# Patient Record
Sex: FEMALE | Race: WHITE | NOT HISPANIC OR LATINO | URBAN - METROPOLITAN AREA
[De-identification: names, ages, dates, MRNs, and addresses within clinical notes are randomized per-mention and may not be internally consistent; named-entity substitution may affect disease eponyms.]

---

## 2023-04-17 ENCOUNTER — TELEPHONE (OUTPATIENT)
Dept: PRIMARY CARE | Facility: CLINIC | Age: 48
End: 2023-04-17
Payer: COMMERCIAL

## 2023-08-17 ENCOUNTER — APPOINTMENT (OUTPATIENT)
Dept: LAB | Facility: LAB | Age: 48
End: 2023-08-17
Payer: COMMERCIAL

## 2023-08-18 LAB
ANION GAP IN SER/PLAS: 11 MMOL/L (ref 10–20)
CALCIUM (MG/DL) IN SER/PLAS: 9.1 MG/DL (ref 8.6–10.3)
CARBON DIOXIDE, TOTAL (MMOL/L) IN SER/PLAS: 27 MMOL/L (ref 21–32)
CHLORIDE (MMOL/L) IN SER/PLAS: 102 MMOL/L (ref 98–107)
CREATININE (MG/DL) IN SER/PLAS: 0.62 MG/DL (ref 0.5–1.05)
GFR FEMALE: >90 ML/MIN/1.73M2
GLUCOSE (MG/DL) IN SER/PLAS: 120 MG/DL (ref 74–99)
POTASSIUM (MMOL/L) IN SER/PLAS: 4.1 MMOL/L (ref 3.5–5.3)
SODIUM (MMOL/L) IN SER/PLAS: 136 MMOL/L (ref 136–145)
UREA NITROGEN (MG/DL) IN SER/PLAS: 12 MG/DL (ref 6–23)

## 2023-09-15 ENCOUNTER — TELEPHONE (OUTPATIENT)
Dept: PRIMARY CARE | Facility: CLINIC | Age: 48
End: 2023-09-15
Payer: COMMERCIAL

## 2023-09-15 DIAGNOSIS — J45.20 MILD INTERMITTENT ASTHMA WITHOUT COMPLICATION (HHS-HCC): Primary | ICD-10-CM

## 2023-09-15 RX ORDER — FLUTICASONE PROPIONATE AND SALMETEROL 250; 50 UG/1; UG/1
1 POWDER RESPIRATORY (INHALATION)
Qty: 60 EACH | Refills: 11 | Status: SHIPPED | OUTPATIENT
Start: 2023-09-15 | End: 2023-09-15

## 2023-09-15 NOTE — TELEPHONE ENCOUNTER
Needs to have advair inhaler refilled. Send to  [facility] - [place], [place] - [id number]

## 2023-09-16 ENCOUNTER — HOSPITAL ENCOUNTER (OUTPATIENT)
Facility: HOSPITAL | Age: 48
Setting detail: OUTPATIENT SURGERY
End: 2023-09-16
Attending: INTERNAL MEDICINE | Admitting: INTERNAL MEDICINE
Payer: COMMERCIAL

## 2023-09-16 DIAGNOSIS — I48.91 UNSPECIFIED ATRIAL FIBRILLATION (MULTI): ICD-10-CM

## 2023-10-04 NOTE — H&P (VIEW-ONLY)
Diagnoses/Problems  Assessed    · Afib (427.31) (I48.91)   · Paroxysmal atrial fibrillation with RVR (427.31) (I48.0)   · Obstructive sleep apnea (327.23) (G47.33)   · Morbid obesity with BMI of 45.0-49.9, adult (278.01,V85.42) (E66.01,Z68.42)   · Encounter to discuss test results (V65.49) (Z71.2)   · Encounter to discuss treatment options (V65.49) (Z71.89)   · Essential hypertension (401.9) (I10)   · High risk medication use (V58.69) (Z79.899)   · Encounter for medication counseling (V65.49) (Z71.89)   · Non-smoker (V49.89) (Z78.9)   · Anticoagulant long-term use (V58.61) (Z79.01)   · Preop cardiovascular exam (V72.81) (Z01.810)    Orders  Afib    · Complete Blood Count; Status:Active; Requested for:24Gzu6091;    · IO EKG Electrocardiogram- 12 Lead; Status:Active - Perform Order,Retrospective  Authorization; Requested for:[id number];   Atrial fibrillation    · Basic Metabolic Panel; Status:Active; Requested for:[id number];   Health Maintenance    · diphenhydrAMINE HCl - 25 MG Oral Capsule (Benadryl Allergy); take two capsule   am day of procedure   · predniSONE 20 MG Oral Tablet; take 3 tabs evening before procedure. 3 tabs am  day of procedure  Morbid obesity with BMI of 45.0-49.9, adult    · Healthy Weight Tips; Status:Complete - Retrospective Authorization;   Done: 70Khg5241   · Losing just 5 to 10 pounds may lower your risk of health problems.; Status:Complete -  Retrospective Authorization;   Done: 63Vgq8591  SocHx: Non-smoker    · Tobacco Use Screening; Status:Complete;   Done: 11Xhs0130    Patient Instructions    Continue same medications  Patient educated on proper medication use.  Patient educated on risk factor modification.  Please bring any lab or test results from other providers/physician to your next appointment.    Please bring all prescription medicines , vitamins, and herbal supplements, and over the counter medicines you use with you every time you come for appointment.     Prescriptions will  not be filled unless you are compliant with your follow up appointments or have a follow up appointment scheduled as per instruction of your physician. Refills should be requested at time of your visit.    IHerbie LPN , am scribing for and in the presence of Dr Ebony Luis.     Feb office visit      Chief Complaint  Patient presented today for a 5 month follow up.         Adult Risk Screening         Tobacco Screening: LUKASZ does not use tobacco.        History of Present Illness  She is here for electrophysiology follow-up      The patient is here to discuss atrial fibrillation.    She has irregular heartbeat couple times a week.  She denies any significant symptoms. Has not had to go to the emergency room..  She is compliant with her medications.      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    We discussed cryoablation PVI in detail. Shared decision making performed. Reviewed cryoablation brochure. She is concerned about her weight gain.  All questions answered. Consented    Discussed associated conditions with atrial fibrillation    She inquires about intermittent FMLA for a fib.    See ROS, PMH, FMH, and surgical history for details.  She's taken two doses of pill in pocket medications.  Longeset episode of a fib was 16 hours.    COVID recovered.      Personal review of ECG and cardiac data reviewed   Outside records:   CT chest. D ye allergy  Echocardiogram April 2023. LVEF 60%.. Relaxation. Mild to moderate TR. Mild worsening TR since 2020.  EP OV Apr 2023  EP OV in November 2022  Hosp Feb 2022  EP OV Aug 2021  Lexiscan July 2021. LVEF 65%. Normal perfusion.  Hospitalization June 2021. EP consult June 2021  ECG: June 2021    ECG today. Normal sinus rhythm. Normal axis. Corrected QT interval 460  ms      Imp / Plan  Paroxysmal transition to persistent atrial fibrillation. Recurrent. Reviewed medications. Reviewed medications. Continue medications. Refills.   Previously failed flecainide to suppress atrial fibrillation  Hgh risk med Eliquis. Pt follows with GYN re: menses and Eliquis  Mild sleep apnea per patient report. Follows with PCP.   Nonsustained wide-complex tachycardia. Likely atrial fibrillation with aberrancy. Normal ejection fraction.  No history of diabetes, TIA or CVA.  PACs and intra-atrial reentry tachycardia. Stable. Reviewed meds. Continue meds. Refills.  History of appendectomy, , ovarian cystectomy and fibroadenoma.  Covid recovered.  Overweight.      AHA recommendations for exercise, diet, and behavioral modification reviewed with pt.    The patient and I discussed the mechanism of arrhythmia, ECG, atrial fibrillation, brochure, shared decision making informed consent, indications for and types of medications, discussion if and what medication refills needed, treatment options, risks, benefits, and imponderables. American Heart Association lifestyle changes and behavioral modification discussed. All questions answered in detail. Counseling over 50% visit regarding above. Patient appreciative of care.           Surgical History  Problems    · History of Appendectomy   · History of  section   · History of Cyst excision   · History of Lumpectomy   · History of Ovarian cystectomy    Current Meds    Medication Name Instruction   Albuterol Sulfate  (90 Base) MCG/ACT Inhalation Aerosol Solution INHALE 2 PUFFS EVERY 4-6 HOURS, SPACED 60 SECONDS APART.   Allegra 180 MG TABS TAKE 1 TABLET DAILY.   ALPRAZolam 0.5 MG Oral Tablet Take 1 tablet daily   dilTIAZem HCl - 30 MG Oral Tablet Take one tablet  up to twice a day as needed for palpitations lasting 10 minutes ore longer.   Dofetilide 250 MCG Oral Capsule TAKE 1 CAPSULE TWICE DAILY.   Eliquis 5 MG Oral Tablet Take 1 tablet  twice daily   Famotidine 40 MG Oral Tablet TAKE 1 TABLET DAILY.   Iron TABS TAKE 1 TABLET ONCE DAILY.   Klor-Con M10 10 MEQ Oral Tablet Extended Release TAKE 1 TABLET DAILY.   Levothyroxine Sodium 75 MCG Oral Tablet TAKE 1 TABLET DAILY.   Losartan Potassium 25 MG Oral Tablet TAKE 1 TABLET DAILY.   Magnesium 400 MG Oral Tablet TAKE 2 TABLET Daily   Metoprolol Tartrate 50 MG Oral Tablet Take 1 tablet twice daily   PARoxetine HCl - 40 MG Oral Tablet TAKE 1 TABLET DAILY.     Allergies  Medication    · ciprofloxacin   Recorded By: Debbie Huynh; 1/30/2020 1:34:31 PM   · Iodinated Contrast Media   Recorded By: Sabrina Bunch; 9/15/2023 1:16:09 PM    Family History  Mother    · Family history of cerebrovascular accident (CVA) (V17.1) (Z82.3)   · Family history of hypertension (V17.49) (Z82.49)   · Family history of type 2 diabetes mellitus (V18.0) (Z83.3)  Father    · Family history of coronary artery disease (V17.3) (Z82.49)   · Family history of hypertension (V17.49) (Z82.49)   · Family history of obesity (V18.19) (Z83.49)   · Family history of type 2 diabetes mellitus (V18.0) (Z83.3)  Sister    · Family history of Healthy female adolescent  Brother    · Family history of cardiomegaly (V17.49) (Z82.49)   · Family history of hypothyroidism (V18.19) (Z83.49)    Social History  Problems    · Caffeine use (V49.89) (Z78.9)   · occasional   · Never a smoker   · No illicit drug use   · Non-smoker (V49.89) (Z78.9)   · Occasional alcohol use   · very rare    Review of Systems    Constitutional: as noted in HPI.   Cardiovascular: as noted in HPI.   Respiratory: as noted in HPI.   Neurological: as noted in HPI.   All other systems have been reviewed and are negative for complaint.      Vitals  Vital Signs    Recorded: 15Sep2023 02:17PM Recorded: 15Sep2023 01:03PM   Systolic 130, LUE, Sitting 134, LUE, Sitting   Diastolic 88, LUE, Sitting 90, LUE, Sitting   Heart Rate  67   Height  5 ft 3 in   Weight  266 lb    BMI Calculated   47.12 kg/m2   BSA Calculated  2.18   Tobacco Use  b) No   PHQ-2  #1. Over the last 2 weeks have you felt down, depressed or hopeless?  (If yes, answer PHQ-9 below)  No   Falls Screening (Age 18+)  a) No falls within the last year     Physical Exam    Constitutional:  morbidly obese.   Eyes: no erythema, swelling or discharge from the eye .   Neck: neck is supple, symmetric, trachea midline, no masses  and no thyromegaly .   Pulmonary: no increased work of breathing or signs of respiratory distress  and lungs clear to auscultation.    Cardiovascular: carotid pulses 2+ bilaterally with no bruit , JVP was normal, no thrills , regular rhythm, normal S1 and S2, no murmurs , pedal pulses 2+ bilaterally  and no edema .   Abdomen: abdomen non-tender, no masses  and no hepatomegaly .   Skin: skin warm and dry, normal skin turgor .   Psychiatric judgment and insight is normal  and oriented to person, place and time .      Procedure  EKG done in office today.         Time    Time spent with patient: 41 minutes of which greater than 50 percent was spent counseling and or coordinating care.      Signatures   Electronically signed by : Ebony Luis MD; Sep 15 2023  8:48PM EST (Author)

## 2023-10-04 NOTE — H&P (VIEW-ONLY)
Diagnoses/Problems  Assessed    · Afib (427.31) (I48.91)   · Paroxysmal atrial fibrillation with RVR (427.31) (I48.0)   · Obstructive sleep apnea (327.23) (G47.33)   · Morbid obesity with BMI of 45.0-49.9, adult (278.01,V85.42) (E66.01,Z68.42)   · Encounter to discuss test results (V65.49) (Z71.2)   · Encounter to discuss treatment options (V65.49) (Z71.89)   · Essential hypertension (401.9) (I10)   · High risk medication use (V58.69) (Z79.899)   · Encounter for medication counseling (V65.49) (Z71.89)   · Non-smoker (V49.89) (Z78.9)   · Anticoagulant long-term use (V58.61) (Z79.01)   · Preop cardiovascular exam (V72.81) (Z01.810)    Orders  Afib    · Complete Blood Count; Status:Active; Requested for:32Ubc5079;    · IO EKG Electrocardiogram- 12 Lead; Status:Active - Perform Order,Retrospective  Authorization; Requested for:25Vmc9272;   Atrial fibrillation    · Basic Metabolic Panel; Status:Active; Requested for:05Zkx7550;   Health Maintenance    · diphenhydrAMINE HCl - 25 MG Oral Capsule (Benadryl Allergy); take two capsule   am day of procedure   · predniSONE 20 MG Oral Tablet; take 3 tabs evening before procedure. 3 tabs am  day of procedure  Morbid obesity with BMI of 45.0-49.9, adult    · Healthy Weight Tips; Status:Complete - Retrospective Authorization;   Done: 32Gfi4076   · Losing just 5 to 10 pounds may lower your risk of health problems.; Status:Complete -  Retrospective Authorization;   Done: 15Qiq6402  SocHx: Non-smoker    · Tobacco Use Screening; Status:Complete;   Done: 60Yos7868    Patient Instructions    Continue same medications  Patient educated on proper medication use.  Patient educated on risk factor modification.  Please bring any lab or test results from other providers/physician to your next appointment.    Please bring all prescription medicines , vitamins, and herbal supplements, and over the counter medicines you use with you every time you come for appointment.     Prescriptions will  not be filled unless you are compliant with your follow up appointments or have a follow up appointment scheduled as per instruction of your physician. Refills should be requested at time of your visit.    IHerbie LPN , am scribing for and in the presence of Dr Ebony Luis.     Feb office visit      Chief Complaint  Patient presented today for a 5 month follow up.         Adult Risk Screening         Tobacco Screening: LUKASZ does not use tobacco.        History of Present Illness  She is here for electrophysiology follow-up      The patient is here to discuss atrial fibrillation.    She has irregular heartbeat couple times a week.  She denies any significant symptoms. Has not had to go to the emergency room..  She is compliant with her medications.      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    We discussed cryoablation PVI in detail. Shared decision making performed. Reviewed cryoablation brochure. She is concerned about her weight gain.  All questions answered. Consented    Discussed associated conditions with atrial fibrillation    She inquires about intermittent FMLA for a fib.    See ROS, PMH, FMH, and surgical history for details.  She's taken two doses of pill in pocket medications.  Longeset episode of a fib was 16 hours.    COVID recovered.      Personal review of ECG and cardiac data reviewed   Outside records:   CT chest. D ye allergy  Echocardiogram April 2023. LVEF 60%.. Relaxation. Mild to moderate TR. Mild worsening TR since 2020.  EP OV Apr 2023  EP OV in November 2022  Hosp Feb 2022  EP OV Aug 2021  Lexiscan July 2021. LVEF 65%. Normal perfusion.  Hospitalization June 2021. EP consult June 2021  ECG: June 2021    ECG today. Normal sinus rhythm. Normal axis. Corrected QT interval 460  ms      Imp / Plan  Paroxysmal transition to persistent atrial fibrillation. Recurrent. Reviewed medications. Reviewed medications. Continue medications. Refills.   Previously failed flecainide to suppress atrial fibrillation  Hgh risk med Eliquis. Pt follows with GYN re: menses and Eliquis  Mild sleep apnea per patient report. Follows with PCP.   Nonsustained wide-complex tachycardia. Likely atrial fibrillation with aberrancy. Normal ejection fraction.  No history of diabetes, TIA or CVA.  PACs and intra-atrial reentry tachycardia. Stable. Reviewed meds. Continue meds. Refills.  History of appendectomy, , ovarian cystectomy and fibroadenoma.  Covid recovered.  Overweight.      AHA recommendations for exercise, diet, and behavioral modification reviewed with pt.    The patient and I discussed the mechanism of arrhythmia, ECG, atrial fibrillation, brochure, shared decision making informed consent, indications for and types of medications, discussion if and what medication refills needed, treatment options, risks, benefits, and imponderables. American Heart Association lifestyle changes and behavioral modification discussed. All questions answered in detail. Counseling over 50% visit regarding above. Patient appreciative of care.           Surgical History  Problems    · History of Appendectomy   · History of  section   · History of Cyst excision   · History of Lumpectomy   · History of Ovarian cystectomy    Current Meds    Medication Name Instruction   Albuterol Sulfate  (90 Base) MCG/ACT Inhalation Aerosol Solution INHALE 2 PUFFS EVERY 4-6 HOURS, SPACED 60 SECONDS APART.   Allegra 180 MG TABS TAKE 1 TABLET DAILY.   ALPRAZolam 0.5 MG Oral Tablet Take 1 tablet daily   dilTIAZem HCl - 30 MG Oral Tablet Take one tablet  up to twice a day as needed for palpitations lasting 10 minutes ore longer.   Dofetilide 250 MCG Oral Capsule TAKE 1 CAPSULE TWICE DAILY.   Eliquis 5 MG Oral Tablet Take 1 tablet  twice daily   Famotidine 40 MG Oral Tablet TAKE 1 TABLET DAILY.   Iron TABS TAKE 1 TABLET ONCE DAILY.   Klor-Con M10 10 MEQ Oral Tablet Extended Release TAKE 1 TABLET DAILY.   Levothyroxine Sodium 75 MCG Oral Tablet TAKE 1 TABLET DAILY.   Losartan Potassium 25 MG Oral Tablet TAKE 1 TABLET DAILY.   Magnesium 400 MG Oral Tablet TAKE 2 TABLET Daily   Metoprolol Tartrate 50 MG Oral Tablet Take 1 tablet twice daily   PARoxetine HCl - 40 MG Oral Tablet TAKE 1 TABLET DAILY.     Allergies  Medication    · ciprofloxacin   Recorded By: Debbie Huynh; 1/30/2020 1:34:31 PM   · Iodinated Contrast Media   Recorded By: Sabrina Bunch; 9/15/2023 1:16:09 PM    Family History  Mother    · Family history of cerebrovascular accident (CVA) (V17.1) (Z82.3)   · Family history of hypertension (V17.49) (Z82.49)   · Family history of type 2 diabetes mellitus (V18.0) (Z83.3)  Father    · Family history of coronary artery disease (V17.3) (Z82.49)   · Family history of hypertension (V17.49) (Z82.49)   · Family history of obesity (V18.19) (Z83.49)   · Family history of type 2 diabetes mellitus (V18.0) (Z83.3)  Sister    · Family history of Healthy female adolescent  Brother    · Family history of cardiomegaly (V17.49) (Z82.49)   · Family history of hypothyroidism (V18.19) (Z83.49)    Social History  Problems    · Caffeine use (V49.89) (Z78.9)   · occasional   · Never a smoker   · No illicit drug use   · Non-smoker (V49.89) (Z78.9)   · Occasional alcohol use   · very rare    Review of Systems    Constitutional: as noted in HPI.   Cardiovascular: as noted in HPI.   Respiratory: as noted in HPI.   Neurological: as noted in HPI.   All other systems have been reviewed and are negative for complaint.      Vitals  Vital Signs    Recorded: 15Sep2023 02:17PM Recorded: 15Sep2023 01:03PM   Systolic 130, LUE, Sitting 134, LUE, Sitting   Diastolic 88, LUE, Sitting 90, LUE, Sitting   Heart Rate  67   Height  5 ft 3 in   Weight  266 lb    BMI Calculated   47.12 kg/m2   BSA Calculated  2.18   Tobacco Use  b) No   PHQ-2  #1. Over the last 2 weeks have you felt down, depressed or hopeless?  (If yes, answer PHQ-9 below)  No   Falls Screening (Age 18+)  a) No falls within the last year     Physical Exam    Constitutional:  morbidly obese.   Eyes: no erythema, swelling or discharge from the eye .   Neck: neck is supple, symmetric, trachea midline, no masses  and no thyromegaly .   Pulmonary: no increased work of breathing or signs of respiratory distress  and lungs clear to auscultation.    Cardiovascular: carotid pulses 2+ bilaterally with no bruit , JVP was normal, no thrills , regular rhythm, normal S1 and S2, no murmurs , pedal pulses 2+ bilaterally  and no edema .   Abdomen: abdomen non-tender, no masses  and no hepatomegaly .   Skin: skin warm and dry, normal skin turgor .   Psychiatric judgment and insight is normal  and oriented to person, place and time .      Procedure  EKG done in office today.         Time    Time spent with patient: 41 minutes of which greater than 50 percent was spent counseling and or coordinating care.      Signatures   Electronically signed by : Ebony Luis MD; Sep 15 2023  8:48PM EST (Author)

## 2023-10-10 ENCOUNTER — TELEPHONE (OUTPATIENT)
Dept: CARDIOLOGY | Facility: HOSPITAL | Age: 48
End: 2023-10-10

## 2023-10-10 RX ORDER — FERROUS SULFATE 325(65) MG
65 TABLET ORAL
COMMUNITY

## 2023-10-10 RX ORDER — LOSARTAN POTASSIUM 25 MG/1
25 TABLET ORAL DAILY
Status: ON HOLD | COMMUNITY
End: 2023-10-12 | Stop reason: SDUPTHER

## 2023-10-10 RX ORDER — POTASSIUM CHLORIDE 750 MG/1
10 TABLET, FILM COATED, EXTENDED RELEASE ORAL DAILY
Status: ON HOLD | COMMUNITY
End: 2023-10-12 | Stop reason: SDUPTHER

## 2023-10-10 RX ORDER — MINERAL OIL
180 ENEMA (ML) RECTAL DAILY
Status: ON HOLD | COMMUNITY
End: 2023-10-12 | Stop reason: SDUPTHER

## 2023-10-10 RX ORDER — ALPRAZOLAM 0.5 MG/1
0.5 TABLET, EXTENDED RELEASE ORAL EVERY MORNING
COMMUNITY
End: 2023-10-11 | Stop reason: ALTCHOICE

## 2023-10-10 RX ORDER — DILTIAZEM HYDROCHLORIDE 30 MG/1
30 TABLET, FILM COATED ORAL 2 TIMES DAILY PRN
COMMUNITY
End: 2023-10-20 | Stop reason: ALTCHOICE

## 2023-10-10 RX ORDER — LEVOTHYROXINE SODIUM 75 UG/1
75 TABLET ORAL
Status: ON HOLD | COMMUNITY
End: 2023-10-12 | Stop reason: SDUPTHER

## 2023-10-10 RX ORDER — FAMOTIDINE 40 MG/1
40 TABLET, FILM COATED ORAL
Status: ON HOLD | COMMUNITY
End: 2023-10-12 | Stop reason: SDUPTHER

## 2023-10-11 ENCOUNTER — HOSPITAL ENCOUNTER (OUTPATIENT)
Dept: RADIOLOGY | Facility: HOSPITAL | Age: 48
Discharge: HOME | End: 2023-10-11
Payer: COMMERCIAL

## 2023-10-11 ENCOUNTER — ANESTHESIA EVENT (OUTPATIENT)
Dept: CARDIOLOGY | Facility: HOSPITAL | Age: 48
DRG: 908 | End: 2023-10-11
Payer: COMMERCIAL

## 2023-10-11 ENCOUNTER — HOSPITAL ENCOUNTER (OUTPATIENT)
Dept: CARDIOLOGY | Facility: HOSPITAL | Age: 48
Discharge: HOME | End: 2023-10-11
Payer: COMMERCIAL

## 2023-10-11 ENCOUNTER — APPOINTMENT (OUTPATIENT)
Dept: CARDIOLOGY | Facility: HOSPITAL | Age: 48
End: 2023-10-11
Payer: COMMERCIAL

## 2023-10-11 DIAGNOSIS — I48.91 UNSPECIFIED ATRIAL FIBRILLATION (MULTI): ICD-10-CM

## 2023-10-11 DIAGNOSIS — I48.0 PAF (PAROXYSMAL ATRIAL FIBRILLATION) (MULTI): Primary | ICD-10-CM

## 2023-10-11 PROBLEM — E66.09 OBESITY DUE TO EXCESS CALORIES: Status: ACTIVE | Noted: 2023-10-11

## 2023-10-11 PROBLEM — I10 PRIMARY HYPERTENSION: Status: ACTIVE | Noted: 2023-10-11

## 2023-10-11 PROBLEM — D64.9 ANEMIA: Status: ACTIVE | Noted: 2023-10-11

## 2023-10-11 PROBLEM — G47.33 OSA (OBSTRUCTIVE SLEEP APNEA): Status: ACTIVE | Noted: 2023-10-11

## 2023-10-11 LAB
ABO GROUP (TYPE) IN BLOOD: NORMAL
ALBUMIN SERPL BCP-MCNC: 4.1 G/DL (ref 3.4–5)
ALP SERPL-CCNC: 81 U/L (ref 33–110)
ALT SERPL W P-5'-P-CCNC: 15 U/L (ref 7–45)
ANION GAP SERPL CALC-SCNC: 15 MMOL/L (ref 10–20)
APTT PPP: 34 SECONDS (ref 27–38)
AST SERPL W P-5'-P-CCNC: 18 U/L (ref 9–39)
BILIRUB SERPL-MCNC: 0.6 MG/DL (ref 0–1.2)
BUN SERPL-MCNC: 12 MG/DL (ref 6–23)
CALCIUM SERPL-MCNC: 9.3 MG/DL (ref 8.6–10.3)
CHLORIDE SERPL-SCNC: 100 MMOL/L (ref 98–107)
CO2 SERPL-SCNC: 25 MMOL/L (ref 21–32)
CREAT SERPL-MCNC: 0.62 MG/DL (ref 0.5–1.05)
ERYTHROCYTE [DISTWIDTH] IN BLOOD BY AUTOMATED COUNT: 17.7 % (ref 11.5–14.5)
GFR SERPL CREATININE-BSD FRML MDRD: >90 ML/MIN/1.73M*2
GLUCOSE SERPL-MCNC: 104 MG/DL (ref 74–99)
HCT VFR BLD AUTO: 34.5 % (ref 36–46)
HGB BLD-MCNC: 10.4 G/DL (ref 12–16)
INR PPP: 1.2 (ref 0.9–1.1)
MCH RBC QN AUTO: 25.2 PG (ref 26–34)
MCHC RBC AUTO-ENTMCNC: 30.1 G/DL (ref 32–36)
MCV RBC AUTO: 84 FL (ref 80–100)
NRBC BLD-RTO: 0 /100 WBCS (ref 0–0)
PLATELET # BLD AUTO: 252 X10*3/UL (ref 150–450)
PMV BLD AUTO: 10.5 FL (ref 7.5–11.5)
POTASSIUM SERPL-SCNC: 3.9 MMOL/L (ref 3.5–5.3)
PROT SERPL-MCNC: 8.2 G/DL (ref 6.4–8.2)
PROTHROMBIN TIME: 13.6 SECONDS (ref 9.8–12.8)
RBC # BLD AUTO: 4.13 X10*6/UL (ref 4–5.2)
RH FACTOR (ANTIGEN D): NORMAL
SODIUM SERPL-SCNC: 136 MMOL/L (ref 136–145)
WBC # BLD AUTO: 7.6 X10*3/UL (ref 4.4–11.3)

## 2023-10-11 PROCEDURE — 71046 X-RAY EXAM CHEST 2 VIEWS: CPT | Mod: FY,FR

## 2023-10-11 PROCEDURE — 7100000009 HC PHASE TWO TIME - INITIAL BASE CHARGE: Performed by: INTERNAL MEDICINE

## 2023-10-11 PROCEDURE — 2500000004 HC RX 250 GENERAL PHARMACY W/ HCPCS (ALT 636 FOR OP/ED): Performed by: INTERNAL MEDICINE

## 2023-10-11 PROCEDURE — 36415 COLL VENOUS BLD VENIPUNCTURE: CPT | Performed by: NURSE PRACTITIONER

## 2023-10-11 PROCEDURE — 2500000001 HC RX 250 WO HCPCS SELF ADMINISTERED DRUGS (ALT 637 FOR MEDICARE OP): Performed by: INTERNAL MEDICINE

## 2023-10-11 PROCEDURE — 99152 MOD SED SAME PHYS/QHP 5/>YRS: CPT | Performed by: INTERNAL MEDICINE

## 2023-10-11 PROCEDURE — 71046 X-RAY EXAM CHEST 2 VIEWS: CPT | Mod: FOREIGN READ | Performed by: RADIOLOGY

## 2023-10-11 PROCEDURE — 84075 ASSAY ALKALINE PHOSPHATASE: CPT | Performed by: NURSE PRACTITIONER

## 2023-10-11 PROCEDURE — 93005 ELECTROCARDIOGRAM TRACING: CPT | Mod: 59

## 2023-10-11 PROCEDURE — 93010 ELECTROCARDIOGRAM REPORT: CPT | Performed by: INTERNAL MEDICINE

## 2023-10-11 PROCEDURE — 7100000010 HC PHASE TWO TIME - EACH INCREMENTAL 1 MINUTE: Performed by: INTERNAL MEDICINE

## 2023-10-11 PROCEDURE — 93312 ECHO TRANSESOPHAGEAL: CPT | Performed by: INTERNAL MEDICINE

## 2023-10-11 PROCEDURE — 85027 COMPLETE CBC AUTOMATED: CPT | Performed by: NURSE PRACTITIONER

## 2023-10-11 PROCEDURE — 85610 PROTHROMBIN TIME: CPT | Performed by: NURSE PRACTITIONER

## 2023-10-11 PROCEDURE — 2500000004 HC RX 250 GENERAL PHARMACY W/ HCPCS (ALT 636 FOR OP/ED): Performed by: NURSE PRACTITIONER

## 2023-10-11 PROCEDURE — 93320 DOPPLER ECHO COMPLETE: CPT

## 2023-10-11 PROCEDURE — 93312 ECHO TRANSESOPHAGEAL: CPT

## 2023-10-11 RX ORDER — MIDAZOLAM HYDROCHLORIDE 1 MG/ML
INJECTION INTRAMUSCULAR; INTRAVENOUS AS NEEDED
Status: DISCONTINUED | OUTPATIENT
Start: 2023-10-11 | End: 2023-10-12

## 2023-10-11 RX ORDER — DIPHENHYDRAMINE HCL 25 MG
25 TABLET ORAL NIGHTLY PRN
Status: ON HOLD | COMMUNITY
End: 2023-10-12 | Stop reason: ALTCHOICE

## 2023-10-11 RX ORDER — SODIUM CHLORIDE 9 MG/ML
20 INJECTION, SOLUTION INTRAVENOUS CONTINUOUS
Status: DISCONTINUED | OUTPATIENT
Start: 2023-10-11 | End: 2023-10-11

## 2023-10-11 RX ORDER — LIDOCAINE HYDROCHLORIDE 20 MG/ML
JELLY TOPICAL AS NEEDED
Status: DISCONTINUED | OUTPATIENT
Start: 2023-10-11 | End: 2023-10-20 | Stop reason: HOSPADM

## 2023-10-11 RX ORDER — FENTANYL CITRATE 50 UG/ML
INJECTION, SOLUTION INTRAMUSCULAR; INTRAVENOUS AS NEEDED
Status: DISCONTINUED | OUTPATIENT
Start: 2023-10-11 | End: 2023-10-20 | Stop reason: HOSPADM

## 2023-10-11 RX ORDER — PREDNISONE 20 MG/1
60 TABLET ORAL DAILY
Status: ON HOLD | COMMUNITY
End: 2023-10-12 | Stop reason: ALTCHOICE

## 2023-10-11 RX ORDER — MINERAL OIL
180 ENEMA (ML) RECTAL DAILY
COMMUNITY
End: 2023-10-11 | Stop reason: SDUPTHER

## 2023-10-11 RX ADMIN — LIDOCAINE HYDROCHLORIDE 1 APPLICATION: 20 JELLY TOPICAL at 13:13

## 2023-10-11 RX ADMIN — MIDAZOLAM HYDROCHLORIDE 1 MG: 1 INJECTION, SOLUTION INTRAMUSCULAR; INTRAVENOUS at 13:33

## 2023-10-11 RX ADMIN — MIDAZOLAM HYDROCHLORIDE 1 MG: 1 INJECTION, SOLUTION INTRAMUSCULAR; INTRAVENOUS at 13:37

## 2023-10-11 RX ADMIN — FENTANYL CITRATE 50 MCG: 50 INJECTION, SOLUTION INTRAMUSCULAR; INTRAVENOUS at 13:31

## 2023-10-11 RX ADMIN — SODIUM CHLORIDE 20 ML/HR: 9 INJECTION, SOLUTION INTRAVENOUS at 11:00

## 2023-10-11 RX ADMIN — FENTANYL CITRATE 50 MCG: 50 INJECTION, SOLUTION INTRAMUSCULAR; INTRAVENOUS at 13:35

## 2023-10-11 RX ADMIN — BENZOCAINE, BUTAMBEN, AND TETRACAINE HYDROCHLORIDE 3 SPRAY: .028; .004; .004 AEROSOL, SPRAY TOPICAL at 13:12

## 2023-10-11 RX ADMIN — MIDAZOLAM HYDROCHLORIDE 2 MG: 1 INJECTION, SOLUTION INTRAMUSCULAR; INTRAVENOUS at 13:30

## 2023-10-11 ASSESSMENT — PAIN SCALES - GENERAL
PAINLEVEL_OUTOF10: 0 - NO PAIN

## 2023-10-11 ASSESSMENT — PAIN - FUNCTIONAL ASSESSMENT
PAIN_FUNCTIONAL_ASSESSMENT: 0-10

## 2023-10-11 ASSESSMENT — COLUMBIA-SUICIDE SEVERITY RATING SCALE - C-SSRS
6. HAVE YOU EVER DONE ANYTHING, STARTED TO DO ANYTHING, OR PREPARED TO DO ANYTHING TO END YOUR LIFE?: NO
1. IN THE PAST MONTH, HAVE YOU WISHED YOU WERE DEAD OR WISHED YOU COULD GO TO SLEEP AND NOT WAKE UP?: NO
6. HAVE YOU EVER DONE ANYTHING, STARTED TO DO ANYTHING, OR PREPARED TO DO ANYTHING TO END YOUR LIFE?: NO
2. HAVE YOU ACTUALLY HAD ANY THOUGHTS OF KILLING YOURSELF?: NO
1. IN THE PAST MONTH, HAVE YOU WISHED YOU WERE DEAD OR WISHED YOU COULD GO TO SLEEP AND NOT WAKE UP?: NO
2. HAVE YOU ACTUALLY HAD ANY THOUGHTS OF KILLING YOURSELF?: NO

## 2023-10-11 NOTE — SIGNIFICANT EVENT
Bedside Swallow Assessment:  Patient's gag reflex has returned.  In addition, patient able to tolerate sips of water.

## 2023-10-11 NOTE — POST-PROCEDURE NOTE
Physician Transition of Care Summary  Invasive Cardiovascular Lab    Procedure Date: 10/11/23     Pre Procedure Indications:    Pre-cryoablation       Post Procedure Diagnosis:   No left atrial appendage thrombus      Procedure(s):   Transesophageal echocardiogram    Procedure Findings:   Normal ventricle solid function  Normal left atrial appendage with no thrombus  Negative bubble study with no PFO or ASD.  Please see SY DIXON for full report    Description of the Procedure:   Transesophageal echocardiogram    Complications:   None    Estimated Blood Loss:   None    Anesthesia: Conscious sedation     any Specimen(s) Removed: None  Order Name Source Comment Collection Info Order Time   VERAB/VERIFY ABORH Blood, Venous **This is for confirming/verifying history of ABORh on file for transfusion of blood products. If this is not for transfusion, please order an ABO/RH [[id number]]. If you have any questions or unsure what to order, please call the blood bank.** Collected By: Letha Griffith RN 10/11/2023 10:48 AM     Release result to [facility]   Immediate        CBC Blood, Venous  Collected By: Letha Griffith RN 10/11/2023 10:48 AM     Release result to MyChart   Immediate        COMPREHENSIVE METABOLIC PANEL Blood, Venous  Collected By: Letha Griffith RN 10/11/2023 10:48 AM     Release result to MyChart   Immediate        COAGULATION SCREEN Blood, Venous  Collected By: Letha Griffith RN 10/11/2023 10:48 AM     Release result to MyChart   Immediate                Electronically signed by: Andi Sanders MD, 10/11/2023

## 2023-10-11 NOTE — DISCHARGE INSTRUCTIONS
Please return to [facility] tomorrow, 10/12/2023 at 6:00 AM for your cryoablation procedure under the care of Dr. Luis.  Nothing to eat or drink after midnight tonight in preparation for your procedure.  You may take your medications as previously instructed with sips of water.

## 2023-10-12 ENCOUNTER — APPOINTMENT (OUTPATIENT)
Dept: RADIOLOGY | Facility: HOSPITAL | Age: 48
DRG: 908 | End: 2023-10-12
Payer: COMMERCIAL

## 2023-10-12 ENCOUNTER — APPOINTMENT (OUTPATIENT)
Dept: CARDIOLOGY | Facility: HOSPITAL | Age: 48
DRG: 908 | End: 2023-10-12
Payer: COMMERCIAL

## 2023-10-12 ENCOUNTER — ANESTHESIA (OUTPATIENT)
Dept: CARDIOLOGY | Facility: HOSPITAL | Age: 48
DRG: 908 | End: 2023-10-12
Payer: COMMERCIAL

## 2023-10-12 ENCOUNTER — HOSPITAL ENCOUNTER (INPATIENT)
Facility: HOSPITAL | Age: 48
LOS: 1 days | Discharge: HOME | DRG: 908 | End: 2023-10-13
Attending: INTERNAL MEDICINE | Admitting: INTERNAL MEDICINE
Payer: COMMERCIAL

## 2023-10-12 DIAGNOSIS — I48.91 UNSPECIFIED ATRIAL FIBRILLATION (MULTI): Primary | ICD-10-CM

## 2023-10-12 DIAGNOSIS — I48.11 LONGSTANDING PERSISTENT ATRIAL FIBRILLATION (MULTI): ICD-10-CM

## 2023-10-12 DIAGNOSIS — T14.8XXA HEMATOMA: ICD-10-CM

## 2023-10-12 PROBLEM — N39.3 URINARY, INCONTINENCE, STRESS FEMALE: Status: ACTIVE | Noted: 2023-10-12

## 2023-10-12 PROBLEM — F32.A DEPRESSION: Status: ACTIVE | Noted: 2023-10-12

## 2023-10-12 PROBLEM — J45.909 EXTRINSIC ASTHMA (HHS-HCC): Status: ACTIVE | Noted: 2023-10-12

## 2023-10-12 PROBLEM — R00.2 PALPITATIONS: Status: ACTIVE | Noted: 2023-10-12

## 2023-10-12 PROBLEM — M25.569 KNEE PAIN: Status: ACTIVE | Noted: 2023-10-12

## 2023-10-12 PROBLEM — Z86.16 HISTORY OF COVID-19: Status: ACTIVE | Noted: 2023-10-12

## 2023-10-12 PROBLEM — J45.40 MODERATE PERSISTENT ASTHMA (HHS-HCC): Status: ACTIVE | Noted: 2023-10-12

## 2023-10-12 PROBLEM — R91.8 LUNG NODULES: Status: ACTIVE | Noted: 2023-10-12

## 2023-10-12 PROBLEM — K58.9 IBS (IRRITABLE BOWEL SYNDROME): Status: ACTIVE | Noted: 2023-10-12

## 2023-10-12 PROBLEM — G47.00 INSOMNIA: Status: ACTIVE | Noted: 2023-10-12

## 2023-10-12 PROBLEM — E66.3 OVERWEIGHT: Status: ACTIVE | Noted: 2023-10-12

## 2023-10-12 PROBLEM — G47.20 DISTURBED SLEEP RHYTHM: Status: ACTIVE | Noted: 2023-10-12

## 2023-10-12 PROBLEM — E03.9 HYPOTHYROIDISM: Status: ACTIVE | Noted: 2023-10-12

## 2023-10-12 LAB
ABO GROUP (TYPE) IN BLOOD: NORMAL
ANTIBODY SCREEN: NORMAL
APTT PPP: 29 SECONDS (ref 27–38)
ATRIAL RATE: 61 BPM
B-HCG SERPL-ACNC: <2 MIU/ML
CHOLEST SERPL-MCNC: 154 MG/DL (ref 0–199)
CHOLESTEROL/HDL RATIO: 3.8
ERYTHROCYTE [DISTWIDTH] IN BLOOD BY AUTOMATED COUNT: 17.6 % (ref 11.5–14.5)
HCT VFR BLD AUTO: 29.5 % (ref 36–46)
HCT VFR BLD AUTO: 31.3 % (ref 36–46)
HDLC SERPL-MCNC: 40.4 MG/DL
HGB BLD-MCNC: 8.8 G/DL (ref 12–16)
HGB BLD-MCNC: 9.5 G/DL (ref 12–16)
INR PPP: 1.4 (ref 0.9–1.1)
LDLC SERPL CALC-MCNC: 88 MG/DL
MCH RBC QN AUTO: 25.7 PG (ref 26–34)
MCHC RBC AUTO-ENTMCNC: 30.4 G/DL (ref 32–36)
MCV RBC AUTO: 85 FL (ref 80–100)
NON HDL CHOLESTEROL: 114 MG/DL (ref 0–149)
NRBC BLD-RTO: 0 /100 WBCS (ref 0–0)
P AXIS: 8 DEGREES
P OFFSET: 201 MS
P ONSET: 134 MS
PLATELET # BLD AUTO: 260 X10*3/UL (ref 150–450)
PMV BLD AUTO: 10.2 FL (ref 7.5–11.5)
PR INTERVAL: 176 MS
PROTHROMBIN TIME: 15.4 SECONDS (ref 9.8–12.8)
Q ONSET: 222 MS
QRS COUNT: 10 BEATS
QRS DURATION: 74 MS
QT INTERVAL: 464 MS
QTC CALCULATION(BAZETT): 467 MS
QTC FREDERICIA: 466 MS
R AXIS: 4 DEGREES
RBC # BLD AUTO: 3.7 X10*6/UL (ref 4–5.2)
RH FACTOR (ANTIGEN D): NORMAL
T AXIS: 35 DEGREES
T OFFSET: 454 MS
TRIGL SERPL-MCNC: 129 MG/DL (ref 0–149)
VENTRICULAR RATE: 61 BPM
VLDL: 26 MG/DL (ref 0–40)
WBC # BLD AUTO: 17.3 X10*3/UL (ref 4.4–11.3)

## 2023-10-12 PROCEDURE — 93612 INTRAVENTRICULAR PACING: CPT | Performed by: INTERNAL MEDICINE

## 2023-10-12 PROCEDURE — 93600 BUNDLE OF HIS RECORDING: CPT | Performed by: INTERNAL MEDICINE

## 2023-10-12 PROCEDURE — 2500000005 HC RX 250 GENERAL PHARMACY W/O HCPCS: Performed by: NURSE ANESTHETIST, CERTIFIED REGISTERED

## 2023-10-12 PROCEDURE — 85347 COAGULATION TIME ACTIVATED: CPT | Performed by: INTERNAL MEDICINE

## 2023-10-12 PROCEDURE — 37799 UNLISTED PX VASCULAR SURGERY: CPT | Performed by: NURSE PRACTITIONER

## 2023-10-12 PROCEDURE — 85027 COMPLETE CBC AUTOMATED: CPT | Performed by: NURSE PRACTITIONER

## 2023-10-12 PROCEDURE — 85670 THROMBIN TIME PLASMA: CPT | Mod: CMCLAB,ELYLAB | Performed by: NURSE PRACTITIONER

## 2023-10-12 PROCEDURE — 2780000003 HC OR 278 NO HCPCS: Performed by: INTERNAL MEDICINE

## 2023-10-12 PROCEDURE — 99291 CRITICAL CARE FIRST HOUR: CPT | Performed by: NURSE PRACTITIONER

## 2023-10-12 PROCEDURE — 85018 HEMOGLOBIN: CPT | Performed by: NURSE PRACTITIONER

## 2023-10-12 PROCEDURE — 2500000004 HC RX 250 GENERAL PHARMACY W/ HCPCS (ALT 636 FOR OP/ED): Performed by: ANESTHESIOLOGY

## 2023-10-12 PROCEDURE — C1766 INTRO/SHEATH,STRBLE,NON-PEEL: HCPCS | Performed by: INTERNAL MEDICINE

## 2023-10-12 PROCEDURE — 99221 1ST HOSP IP/OBS SF/LOW 40: CPT | Performed by: INTERNAL MEDICINE

## 2023-10-12 PROCEDURE — C1730 CATH, EP, 19 OR FEW ELECT: HCPCS | Performed by: INTERNAL MEDICINE

## 2023-10-12 PROCEDURE — 4B02XTZ MEASUREMENT OF CARDIAC DEFIBRILLATOR, EXTERNAL APPROACH: ICD-10-PCS | Performed by: INTERNAL MEDICINE

## 2023-10-12 PROCEDURE — 86920 COMPATIBILITY TEST SPIN: CPT

## 2023-10-12 PROCEDURE — 2500000001 HC RX 250 WO HCPCS SELF ADMINISTERED DRUGS (ALT 637 FOR MEDICARE OP): Performed by: NURSE PRACTITIONER

## 2023-10-12 PROCEDURE — 85014 HEMATOCRIT: CPT | Performed by: NURSE PRACTITIONER

## 2023-10-12 PROCEDURE — 93656 COMPRE EP EVAL ABLTJ ATR FIB: CPT | Performed by: INTERNAL MEDICINE

## 2023-10-12 PROCEDURE — 93926 LOWER EXTREMITY STUDY: CPT

## 2023-10-12 PROCEDURE — 86850 RBC ANTIBODY SCREEN: CPT | Performed by: NURSE PRACTITIONER

## 2023-10-12 PROCEDURE — 055Y3ZZ DESTRUCTION OF UPPER VEIN, PERCUTANEOUS APPROACH: ICD-10-PCS | Performed by: INTERNAL MEDICINE

## 2023-10-12 PROCEDURE — 93971 EXTREMITY STUDY: CPT

## 2023-10-12 PROCEDURE — 2720000007 HC OR 272 NO HCPCS: Performed by: INTERNAL MEDICINE

## 2023-10-12 PROCEDURE — 93656 COMPRE EP EVAL ABLTJ ATR FIB: CPT | Mod: 22 | Performed by: INTERNAL MEDICINE

## 2023-10-12 PROCEDURE — 86901 BLOOD TYPING SEROLOGIC RH(D): CPT | Performed by: NURSE PRACTITIONER

## 2023-10-12 PROCEDURE — 2020000001 HC ICU ROOM DAILY

## 2023-10-12 PROCEDURE — 3700000001 HC GENERAL ANESTHESIA TIME - INITIAL BASE CHARGE: Performed by: INTERNAL MEDICINE

## 2023-10-12 PROCEDURE — 84702 CHORIONIC GONADOTROPIN TEST: CPT | Performed by: NURSE PRACTITIONER

## 2023-10-12 PROCEDURE — 85610 PROTHROMBIN TIME: CPT | Performed by: NURSE PRACTITIONER

## 2023-10-12 PROCEDURE — 6360000002 HC RX 636 FACTOR: Performed by: INTERNAL MEDICINE

## 2023-10-12 PROCEDURE — 76937 US GUIDE VASCULAR ACCESS: CPT | Performed by: INTERNAL MEDICINE

## 2023-10-12 PROCEDURE — 2500000004 HC RX 250 GENERAL PHARMACY W/ HCPCS (ALT 636 FOR OP/ED): Performed by: NURSE ANESTHETIST, CERTIFIED REGISTERED

## 2023-10-12 PROCEDURE — C1760 CLOSURE DEV, VASC: HCPCS | Performed by: INTERNAL MEDICINE

## 2023-10-12 PROCEDURE — 2500000004 HC RX 250 GENERAL PHARMACY W/ HCPCS (ALT 636 FOR OP/ED): Performed by: INTERNAL MEDICINE

## 2023-10-12 PROCEDURE — 2580000001 HC RX 258 IV SOLUTIONS: Performed by: ANESTHESIOLOGY

## 2023-10-12 PROCEDURE — C1893 INTRO/SHEATH, FIXED,NON-PEEL: HCPCS | Performed by: INTERNAL MEDICINE

## 2023-10-12 PROCEDURE — 2500000004 HC RX 250 GENERAL PHARMACY W/ HCPCS (ALT 636 FOR OP/ED): Performed by: NURSE PRACTITIONER

## 2023-10-12 PROCEDURE — 93005 ELECTROCARDIOGRAM TRACING: CPT

## 2023-10-12 PROCEDURE — 85730 THROMBOPLASTIN TIME PARTIAL: CPT | Performed by: NURSE PRACTITIONER

## 2023-10-12 PROCEDURE — C1759 CATH, INTRA ECHOCARDIOGRAPHY: HCPCS | Performed by: INTERNAL MEDICINE

## 2023-10-12 PROCEDURE — 36415 COLL VENOUS BLD VENIPUNCTURE: CPT | Performed by: NURSE PRACTITIONER

## 2023-10-12 PROCEDURE — 3700000002 HC GENERAL ANESTHESIA TIME - EACH INCREMENTAL 1 MINUTE: Performed by: INTERNAL MEDICINE

## 2023-10-12 PROCEDURE — 2500000005 HC RX 250 GENERAL PHARMACY W/O HCPCS: Performed by: INTERNAL MEDICINE

## 2023-10-12 PROCEDURE — C1894 INTRO/SHEATH, NON-LASER: HCPCS | Performed by: INTERNAL MEDICINE

## 2023-10-12 PROCEDURE — A4217 STERILE WATER/SALINE, 500 ML: HCPCS | Performed by: INTERNAL MEDICINE

## 2023-10-12 PROCEDURE — C1769 GUIDE WIRE: HCPCS | Performed by: INTERNAL MEDICINE

## 2023-10-12 PROCEDURE — 02K83ZZ MAP CONDUCTION MECHANISM, PERCUTANEOUS APPROACH: ICD-10-PCS | Performed by: INTERNAL MEDICINE

## 2023-10-12 PROCEDURE — C1733 CATH, EP, OTHR THAN COOL-TIP: HCPCS | Performed by: INTERNAL MEDICINE

## 2023-10-12 PROCEDURE — 80061 LIPID PANEL: CPT | Performed by: NURSE PRACTITIONER

## 2023-10-12 RX ORDER — HEPARIN SODIUM 10000 [USP'U]/100ML
INJECTION, SOLUTION INTRAVENOUS CONTINUOUS PRN
Status: COMPLETED | OUTPATIENT
Start: 2023-10-12 | End: 2023-10-12

## 2023-10-12 RX ORDER — PAROXETINE HYDROCHLORIDE 20 MG/1
40 TABLET, FILM COATED ORAL DAILY
Status: DISCONTINUED | OUTPATIENT
Start: 2023-10-12 | End: 2023-10-13 | Stop reason: HOSPADM

## 2023-10-12 RX ORDER — LOSARTAN POTASSIUM 25 MG/1
25 TABLET ORAL AS NEEDED
COMMUNITY

## 2023-10-12 RX ORDER — SODIUM CHLORIDE, SODIUM LACTATE, POTASSIUM CHLORIDE, CALCIUM CHLORIDE 600; 310; 30; 20 MG/100ML; MG/100ML; MG/100ML; MG/100ML
100 INJECTION, SOLUTION INTRAVENOUS CONTINUOUS
Status: DISCONTINUED | OUTPATIENT
Start: 2023-10-12 | End: 2023-10-12

## 2023-10-12 RX ORDER — MORPHINE SULFATE 2 MG/ML
2 INJECTION, SOLUTION INTRAMUSCULAR; INTRAVENOUS EVERY 4 HOURS PRN
Status: DISCONTINUED | OUTPATIENT
Start: 2023-10-12 | End: 2023-10-13 | Stop reason: HOSPADM

## 2023-10-12 RX ORDER — FENTANYL CITRATE 50 UG/ML
50 INJECTION, SOLUTION INTRAMUSCULAR; INTRAVENOUS EVERY 5 MIN PRN
Status: DISCONTINUED | OUTPATIENT
Start: 2023-10-12 | End: 2023-10-12

## 2023-10-12 RX ORDER — HEPARIN SODIUM 1000 [USP'U]/ML
INJECTION, SOLUTION INTRAVENOUS; SUBCUTANEOUS AS NEEDED
Status: DISCONTINUED | OUTPATIENT
Start: 2023-10-12 | End: 2023-10-12 | Stop reason: HOSPADM

## 2023-10-12 RX ORDER — PAROXETINE HYDROCHLORIDE 40 MG/1
40 TABLET, FILM COATED ORAL DAILY
COMMUNITY
Start: 2022-02-28 | End: 2023-11-13 | Stop reason: SDUPTHER

## 2023-10-12 RX ORDER — MEPERIDINE HYDROCHLORIDE 25 MG/ML
12.5 INJECTION INTRAMUSCULAR; INTRAVENOUS; SUBCUTANEOUS EVERY 10 MIN PRN
Status: DISCONTINUED | OUTPATIENT
Start: 2023-10-12 | End: 2023-10-12

## 2023-10-12 RX ORDER — LEVOTHYROXINE SODIUM 75 UG/1
75 TABLET ORAL
Status: DISCONTINUED | OUTPATIENT
Start: 2023-10-13 | End: 2023-10-13 | Stop reason: HOSPADM

## 2023-10-12 RX ORDER — PHENYLEPHRINE 10 MG/250 ML(40 MCG/ML)IN 0.9 % SOD.CHLORIDE INTRAVENOUS
CONTINUOUS PRN
Status: DISCONTINUED | OUTPATIENT
Start: 2023-10-12 | End: 2023-10-12

## 2023-10-12 RX ORDER — MIDAZOLAM HYDROCHLORIDE 1 MG/ML
INJECTION, SOLUTION INTRAMUSCULAR; INTRAVENOUS AS NEEDED
Status: DISCONTINUED | OUTPATIENT
Start: 2023-10-12 | End: 2023-10-12

## 2023-10-12 RX ORDER — NICARDIPINE HYDROCHLORIDE 0.2 MG/ML
5 INJECTION INTRAVENOUS CONTINUOUS
Status: DISCONTINUED | OUTPATIENT
Start: 2023-10-12 | End: 2023-10-12

## 2023-10-12 RX ORDER — LOSARTAN POTASSIUM 25 MG/1
25 TABLET ORAL DAILY
Status: DISCONTINUED | OUTPATIENT
Start: 2023-10-12 | End: 2023-10-13 | Stop reason: HOSPADM

## 2023-10-12 RX ORDER — REMIFENTANIL HYDROCHLORIDE 1 MG/ML
INJECTION, POWDER, LYOPHILIZED, FOR SOLUTION INTRAVENOUS CONTINUOUS PRN
Status: DISCONTINUED | OUTPATIENT
Start: 2023-10-12 | End: 2023-10-12

## 2023-10-12 RX ORDER — ALPRAZOLAM 0.5 MG/1
0.5 TABLET ORAL DAILY
COMMUNITY
Start: 2022-10-21 | End: 2023-10-20 | Stop reason: ALTCHOICE

## 2023-10-12 RX ORDER — LORATADINE 10 MG/1
10 TABLET ORAL DAILY
Status: DISCONTINUED | OUTPATIENT
Start: 2023-10-12 | End: 2023-10-13 | Stop reason: HOSPADM

## 2023-10-12 RX ORDER — PROTAMINE SULFATE 10 MG/ML
INJECTION, SOLUTION INTRAVENOUS AS NEEDED
Status: DISCONTINUED | OUTPATIENT
Start: 2023-10-12 | End: 2023-10-12

## 2023-10-12 RX ORDER — ROCURONIUM BROMIDE 10 MG/ML
INJECTION, SOLUTION INTRAVENOUS AS NEEDED
Status: DISCONTINUED | OUTPATIENT
Start: 2023-10-12 | End: 2023-10-12

## 2023-10-12 RX ORDER — CARVEDILOL 12.5 MG/1
12.5 TABLET ORAL
COMMUNITY
End: 2023-10-20 | Stop reason: ALTCHOICE

## 2023-10-12 RX ORDER — CHLORHEXIDINE GLUCONATE 40 MG/ML
SOLUTION TOPICAL ONCE
Status: COMPLETED | OUTPATIENT
Start: 2023-10-12 | End: 2023-10-12

## 2023-10-12 RX ORDER — FERROUS SULFATE 325(65) MG
65 TABLET ORAL
Status: DISCONTINUED | OUTPATIENT
Start: 2023-10-13 | End: 2023-10-13 | Stop reason: HOSPADM

## 2023-10-12 RX ORDER — SODIUM CHLORIDE, SODIUM LACTATE, POTASSIUM CHLORIDE, CALCIUM CHLORIDE 600; 310; 30; 20 MG/100ML; MG/100ML; MG/100ML; MG/100ML
100 INJECTION, SOLUTION INTRAVENOUS CONTINUOUS
Status: DISCONTINUED | OUTPATIENT
Start: 2023-10-12 | End: 2023-10-13 | Stop reason: HOSPADM

## 2023-10-12 RX ORDER — ALBUTEROL SULFATE 90 UG/1
2 AEROSOL, METERED RESPIRATORY (INHALATION) EVERY 4 HOURS PRN
Status: DISCONTINUED | OUTPATIENT
Start: 2023-10-12 | End: 2023-10-13 | Stop reason: HOSPADM

## 2023-10-12 RX ORDER — OXYCODONE HYDROCHLORIDE 5 MG/1
5 TABLET ORAL EVERY 4 HOURS PRN
Status: DISCONTINUED | OUTPATIENT
Start: 2023-10-12 | End: 2023-10-12

## 2023-10-12 RX ORDER — FAMOTIDINE 40 MG/1
40 TABLET, FILM COATED ORAL DAILY
COMMUNITY
End: 2024-01-09 | Stop reason: WASHOUT

## 2023-10-12 RX ORDER — DILTIAZEM HYDROCHLORIDE 30 MG/1
30 TABLET, FILM COATED ORAL 2 TIMES DAILY PRN
Status: DISCONTINUED | OUTPATIENT
Start: 2023-10-12 | End: 2023-10-13 | Stop reason: HOSPADM

## 2023-10-12 RX ORDER — METOPROLOL TARTRATE 50 MG/1
50 TABLET ORAL 2 TIMES DAILY
Status: DISCONTINUED | OUTPATIENT
Start: 2023-10-12 | End: 2023-10-13 | Stop reason: HOSPADM

## 2023-10-12 RX ORDER — CHOLECALCIFEROL (VITAMIN D3) 25 MCG
25 TABLET ORAL DAILY
COMMUNITY
End: 2023-10-20 | Stop reason: ALTCHOICE

## 2023-10-12 RX ORDER — ONDANSETRON HYDROCHLORIDE 2 MG/ML
4 INJECTION, SOLUTION INTRAVENOUS ONCE AS NEEDED
Status: DISCONTINUED | OUTPATIENT
Start: 2023-10-12 | End: 2023-10-13 | Stop reason: HOSPADM

## 2023-10-12 RX ORDER — LEVOTHYROXINE SODIUM 75 UG/1
75 TABLET ORAL DAILY
COMMUNITY
Start: 2021-02-22 | End: 2023-12-13 | Stop reason: WASHOUT

## 2023-10-12 RX ORDER — LIDOCAINE HYDROCHLORIDE 20 MG/ML
INJECTION, SOLUTION EPIDURAL; INFILTRATION; INTRACAUDAL; PERINEURAL AS NEEDED
Status: DISCONTINUED | OUTPATIENT
Start: 2023-10-12 | End: 2023-10-12

## 2023-10-12 RX ORDER — PROPOFOL 10 MG/ML
INJECTION, EMULSION INTRAVENOUS AS NEEDED
Status: DISCONTINUED | OUTPATIENT
Start: 2023-10-12 | End: 2023-10-12

## 2023-10-12 RX ORDER — FAMOTIDINE 20 MG/1
40 TABLET, FILM COATED ORAL DAILY
Status: DISCONTINUED | OUTPATIENT
Start: 2023-10-12 | End: 2023-10-13 | Stop reason: HOSPADM

## 2023-10-12 RX ORDER — ALBUTEROL SULFATE 90 UG/1
2 AEROSOL, METERED RESPIRATORY (INHALATION)
COMMUNITY
Start: 2020-04-02 | End: 2023-10-20 | Stop reason: ALTCHOICE

## 2023-10-12 RX ORDER — LANOLIN ALCOHOL/MO/W.PET/CERES
800 CREAM (GRAM) TOPICAL DAILY
Status: DISCONTINUED | OUTPATIENT
Start: 2023-10-12 | End: 2023-10-13 | Stop reason: HOSPADM

## 2023-10-12 RX ORDER — DILTIAZEM HYDROCHLORIDE 30 MG/1
30 TABLET, FILM COATED ORAL 3 TIMES DAILY
COMMUNITY
Start: 2022-11-03

## 2023-10-12 RX ORDER — METOPROLOL TARTRATE 1 MG/ML
INJECTION, SOLUTION INTRAVENOUS AS NEEDED
Status: DISCONTINUED | OUTPATIENT
Start: 2023-10-12 | End: 2023-10-12

## 2023-10-12 RX ORDER — LABETALOL HYDROCHLORIDE 5 MG/ML
INJECTION, SOLUTION INTRAVENOUS AS NEEDED
Status: DISCONTINUED | OUTPATIENT
Start: 2023-10-12 | End: 2023-10-12

## 2023-10-12 RX ORDER — LIDOCAINE HYDROCHLORIDE 10 MG/ML
INJECTION, SOLUTION EPIDURAL; INFILTRATION; INTRACAUDAL; PERINEURAL AS NEEDED
Status: DISCONTINUED | OUTPATIENT
Start: 2023-10-12 | End: 2023-10-12 | Stop reason: HOSPADM

## 2023-10-12 RX ORDER — LIDOCAINE HYDROCHLORIDE 10 MG/ML
0.1 INJECTION, SOLUTION EPIDURAL; INFILTRATION; INTRACAUDAL; PERINEURAL ONCE
Status: DISCONTINUED | OUTPATIENT
Start: 2023-10-12 | End: 2023-10-13 | Stop reason: HOSPADM

## 2023-10-12 RX ORDER — ACETAMINOPHEN 325 MG/1
650 TABLET ORAL EVERY 6 HOURS PRN
Status: DISCONTINUED | OUTPATIENT
Start: 2023-10-12 | End: 2023-10-13 | Stop reason: HOSPADM

## 2023-10-12 RX ORDER — ONDANSETRON HYDROCHLORIDE 2 MG/ML
INJECTION, SOLUTION INTRAVENOUS AS NEEDED
Status: DISCONTINUED | OUTPATIENT
Start: 2023-10-12 | End: 2023-10-12

## 2023-10-12 RX ORDER — NICARDIPINE HYDROCHLORIDE 0.2 MG/ML
5 INJECTION INTRAVENOUS CONTINUOUS
Status: DISCONTINUED | OUTPATIENT
Start: 2023-10-12 | End: 2023-10-13

## 2023-10-12 RX ORDER — FLUTICASONE FUROATE AND VILANTEROL 200; 25 UG/1; UG/1
1 POWDER RESPIRATORY (INHALATION)
Status: DISCONTINUED | OUTPATIENT
Start: 2023-10-12 | End: 2023-10-13 | Stop reason: HOSPADM

## 2023-10-12 RX ORDER — MINERAL OIL
1 ENEMA (ML) RECTAL DAILY
COMMUNITY

## 2023-10-12 RX ORDER — DOFETILIDE 0.25 MG/1
250 CAPSULE ORAL 2 TIMES DAILY
Status: DISCONTINUED | OUTPATIENT
Start: 2023-10-12 | End: 2023-10-13 | Stop reason: HOSPADM

## 2023-10-12 RX ORDER — NORTRIPTYLINE HYDROCHLORIDE 10 MG/1
30 CAPSULE ORAL NIGHTLY
COMMUNITY
Start: 2022-08-08 | End: 2023-10-20 | Stop reason: ALTCHOICE

## 2023-10-12 RX ADMIN — MIDAZOLAM 2 MG: 1 INJECTION INTRAMUSCULAR; INTRAVENOUS at 11:32

## 2023-10-12 RX ADMIN — HEPARIN SODIUM 10000 UNITS: 1000 INJECTION, SOLUTION INTRAVENOUS; SUBCUTANEOUS at 09:59

## 2023-10-12 RX ADMIN — PROPOFOL 200 MG: 10 INJECTION, EMULSION INTRAVENOUS at 08:05

## 2023-10-12 RX ADMIN — LABETALOL HYDROCHLORIDE 5 MG: 5 INJECTION, SOLUTION INTRAVENOUS at 12:23

## 2023-10-12 RX ADMIN — ACETAMINOPHEN 650 MG: 325 TABLET ORAL at 22:04

## 2023-10-12 RX ADMIN — Medication 1 APPLICATION: at 06:58

## 2023-10-12 RX ADMIN — REMIFENTANIL HYDROCHLORIDE 50 MCG: 1 INJECTION, POWDER, LYOPHILIZED, FOR SOLUTION INTRAVENOUS at 11:03

## 2023-10-12 RX ADMIN — METOPROLOL TARTRATE 3 MG: 5 INJECTION INTRAVENOUS at 12:18

## 2023-10-12 RX ADMIN — HEPARIN SODIUM 2000 UNITS: 1000 INJECTION, SOLUTION INTRAVENOUS; SUBCUTANEOUS at 11:00

## 2023-10-12 RX ADMIN — HEPARIN SODIUM 2000 UNITS: 1000 INJECTION, SOLUTION INTRAVENOUS; SUBCUTANEOUS at 10:58

## 2023-10-12 RX ADMIN — LIDOCAINE HYDROCHLORIDE 100 MG: 20 INJECTION, SOLUTION EPIDURAL; INFILTRATION; INTRACAUDAL; PERINEURAL at 08:05

## 2023-10-12 RX ADMIN — DEXAMETHASONE SODIUM PHOSPHATE 8 MG: 4 INJECTION, SOLUTION INTRAMUSCULAR; INTRAVENOUS at 08:26

## 2023-10-12 RX ADMIN — NICARDIPINE HYDROCHLORIDE 5 MG/HR: 0.2 INJECTION, SOLUTION INTRAVENOUS at 13:10

## 2023-10-12 RX ADMIN — METOPROLOL TARTRATE 50 MG: 50 TABLET, FILM COATED ORAL at 22:01

## 2023-10-12 RX ADMIN — LOSARTAN POTASSIUM 25 MG: 25 TABLET, FILM COATED ORAL at 15:13

## 2023-10-12 RX ADMIN — MORPHINE SULFATE 2 MG: 2 INJECTION, SOLUTION INTRAMUSCULAR; INTRAVENOUS at 16:53

## 2023-10-12 RX ADMIN — NICARDIPINE HYDROCHLORIDE 5 MG/HR: 0.2 INJECTION INTRAVENOUS at 13:30

## 2023-10-12 RX ADMIN — FENTANYL CITRATE 50 MCG: 50 INJECTION, SOLUTION INTRAMUSCULAR; INTRAVENOUS at 12:37

## 2023-10-12 RX ADMIN — Medication: at 15:30

## 2023-10-12 RX ADMIN — PROTAMINE SULFATE 25 MG: 10 INJECTION, SOLUTION INTRAVENOUS at 11:32

## 2023-10-12 RX ADMIN — PROTAMINE SULFATE 5 MG: 10 INJECTION, SOLUTION INTRAVENOUS at 11:28

## 2023-10-12 RX ADMIN — DOFETILIDE 250 MCG: 0.25 CAPSULE ORAL at 22:01

## 2023-10-12 RX ADMIN — ROCURONIUM BROMIDE 50 MG: 50 INJECTION, SOLUTION INTRAVENOUS at 08:05

## 2023-10-12 RX ADMIN — LABETALOL HYDROCHLORIDE 5 MG: 5 INJECTION, SOLUTION INTRAVENOUS at 12:30

## 2023-10-12 RX ADMIN — PAROXETINE HYDROCHLORIDE 40 MG: 20 TABLET, FILM COATED ORAL at 22:02

## 2023-10-12 RX ADMIN — HEPARIN SODIUM 2000 UNITS: 1000 INJECTION, SOLUTION INTRAVENOUS; SUBCUTANEOUS at 11:22

## 2023-10-12 RX ADMIN — ONDANSETRON 4 MG: 2 INJECTION INTRAMUSCULAR; INTRAVENOUS at 11:32

## 2023-10-12 RX ADMIN — PROTAMINE SULFATE 20 MG: 10 INJECTION, SOLUTION INTRAVENOUS at 11:30

## 2023-10-12 RX ADMIN — SODIUM CHLORIDE, SODIUM LACTATE, POTASSIUM CHLORIDE, AND CALCIUM CHLORIDE: 600; 310; 30; 20 INJECTION, SOLUTION INTRAVENOUS at 07:54

## 2023-10-12 RX ADMIN — Medication 2000 UNITS: at 13:54

## 2023-10-12 RX ADMIN — LABETALOL HYDROCHLORIDE 5 MG: 5 INJECTION, SOLUTION INTRAVENOUS at 12:40

## 2023-10-12 RX ADMIN — REMIFENTANIL HYDROCHLORIDE 0.08 MCG/KG/MIN: 1 INJECTION, POWDER, LYOPHILIZED, FOR SOLUTION INTRAVENOUS at 08:33

## 2023-10-12 RX ADMIN — PHENYLEPHRINE 10 MG/250 ML(40 MCG/ML)IN 0.9 % SOD.CHLORIDE INTRAVENOUS 0.05 MCG/KG/MIN: at 10:05

## 2023-10-12 RX ADMIN — METOPROLOL TARTRATE 2 MG: 5 INJECTION INTRAVENOUS at 12:16

## 2023-10-12 SDOH — HEALTH STABILITY: MENTAL HEALTH: CURRENT SMOKER: 0

## 2023-10-12 ASSESSMENT — COGNITIVE AND FUNCTIONAL STATUS - GENERAL
DAILY ACTIVITIY SCORE: 24
MOBILITY SCORE: 24

## 2023-10-12 ASSESSMENT — PAIN SCALES - GENERAL
PAINLEVEL_OUTOF10: 0 - NO PAIN
PAINLEVEL_OUTOF10: 0 - NO PAIN
PAINLEVEL_OUTOF10: 6
PAINLEVEL_OUTOF10: 1

## 2023-10-12 ASSESSMENT — ENCOUNTER SYMPTOMS
GASTROINTESTINAL NEGATIVE: 1
MUSCULOSKELETAL NEGATIVE: 1
CONSTITUTIONAL NEGATIVE: 1
ALLERGIC/IMMUNOLOGIC NEGATIVE: 1
RESPIRATORY NEGATIVE: 1
EYES NEGATIVE: 1
ENDOCRINE NEGATIVE: 1
CARDIOVASCULAR NEGATIVE: 1
PSYCHIATRIC NEGATIVE: 1
HEMATOLOGIC/LYMPHATIC NEGATIVE: 1
NEUROLOGICAL NEGATIVE: 1

## 2023-10-12 ASSESSMENT — COLUMBIA-SUICIDE SEVERITY RATING SCALE - C-SSRS
1. IN THE PAST MONTH, HAVE YOU WISHED YOU WERE DEAD OR WISHED YOU COULD GO TO SLEEP AND NOT WAKE UP?: NO
2. HAVE YOU ACTUALLY HAD ANY THOUGHTS OF KILLING YOURSELF?: NO
6. HAVE YOU EVER DONE ANYTHING, STARTED TO DO ANYTHING, OR PREPARED TO DO ANYTHING TO END YOUR LIFE?: NO

## 2023-10-12 ASSESSMENT — PAIN - FUNCTIONAL ASSESSMENT
PAIN_FUNCTIONAL_ASSESSMENT: 0-10

## 2023-10-12 NOTE — ANESTHESIA POSTPROCEDURE EVALUATION
Patient: Joyce Carmichael    Procedure Summary       Date: 10/12/23 Room / Location: [facility] / [facility]    Anesthesia Start: 0754 Anesthesia Stop:     Procedure: ABLATION A-FIB CRYO Diagnosis:       Unspecified atrial fibrillation (CMS/HCC)      (i48.91)    Providers: Ebony Luis MD Responsible Provider: ADENIKE Ann    Anesthesia Type: general ASA Status: 3            Anesthesia Type: general    Vitals Value Taken Time   /91 10/12/23 1302   Temp 36.5 10/12/23 1302   Pulse 75 10/12/23 1302   Resp 15 10/12/23 1302   SpO2 100 10/12/23 1302         No notable events documented.

## 2023-10-12 NOTE — Clinical Note
Arterial sheath removed, fem stop applied.  Area without drainage. Rt pedal pulse doppled with good signal

## 2023-10-12 NOTE — ANESTHESIA PROCEDURE NOTES
Arterial Line:    Date/Time: 10/12/2023 7:20 AM    Staffing  Performed: attending   Authorized by: JOEL Ann-CRNA    Performed by: Geronimo Espinoza MD    An arterial line was placed. Procedure performed using ultrasound guidance.in the OR for the following indication(s): continuous blood pressure monitoring and blood sampling needed.    A 20 gauge (size), 1 and 3/4 inch (length), Arrow (type) catheter was placed into the Left radial artery, secured by Tegaderm,   Seldinger technique used.  Events:  patient tolerated procedure well with no complications.

## 2023-10-12 NOTE — ANESTHESIA PROCEDURE NOTES
Airway  Date/Time: 10/12/2023 8:07 AM  Urgency: elective    Airway not difficult    Staffing  Performed: CRNA   Authorized by: ADENIKE Ann    Performed by: ADENIKE Ann  Patient location during procedure: OR (ep lab)    Indications and Patient Condition  Indications for airway management: anesthesia  Sedation level: deep  Preoxygenated: yes  Mask difficulty assessment: 1 - vent by mask  Planned trial extubation    Final Airway Details  Final airway type: endotracheal airway      Successful airway: ETT  Cuffed: yes   Successful intubation technique: video laryngoscopy  Facilitating devices/methods: intubating stylet  Endotracheal tube insertion site: oral  Blade type: covarrubias 3.  Blade size: #3  ETT size (mm): 7.0  Cormack-Lehane Classification: grade I - full view of glottis  Placement verified by: chest auscultation and capnometry   Cuff volume (mL): 6  Measured from: teeth  ETT to teeth (cm): 21  Number of attempts at approach: 1    Additional Comments  Atraumatic placement

## 2023-10-12 NOTE — INTERVAL H&P NOTE
H&P reviewed. The patient was examined.    AMADOU reviewed.  Discussed with patient. No thrombus. No PFO.  hcG negative.    Shared decision making performed. All questions answered. Informed consent confirmed.

## 2023-10-12 NOTE — CONSULTS
Consults    Reason For Consult  Post procedural bleeding evaluation    History Of Present Illness  Joyce Carmichael is a 48 y.o. female with history of morbid obesity, obstructive sleep apnea, hypothyroidism, depression, paroxysmal atrial fibrillation with RVR, previously failed flecainide and anticoagulated with Eliquis.  She presented today for elective PVI and cryoablation.  Left venous access was being obtained, and left femoral artery was cannulated with a 10 Turkmen sheath.  She continued to have significant intraprocedural bleeding from the prior sheath site despite application of manual pressure.  She was transferred to ICU with FemoStop in place, and was given 1 dose of Kcentra.  Moderately hypertensive requiring nicardipine infusion.  Vascular surgery was asked to see for possible further intervention.    At time of consultation, the patient was in no distress, mildly sedated from prior procedure.  FemoStop was removed, manual pressure was maintained to the left groin access site with complete hemostasis.  Stat coag profile was obtained, INR mildly elevated at 1.4, slight drift in hemoglobin from 10.4 on admission to 9.5.  After removal of manual pressure, the left femoral access site has remained soft with no further oozing.  Distal pulses are easily palpable.  No reports of groin pain or flank pain.  Patient was seen at the bedside in conjunction with Dr. Segovia, recommendations to obtain arterial duplex after hemostasis has been achieved to assess for AV fistula or pseudoaneurysm, bedrest with HOB flat for 6 hours, eliquis to remain on hold today.     Past Medical History  She has a past medical history of Arrhythmia, Hyperlipidemia, Hypertension, and Sleep apnea.    Surgical History  Appendectomy    Lumpectomy  Ovarian cyst excision    Social History  She reports that she has never smoked. She has never used smokeless tobacco. She reports that she does not drink alcohol and does not use  drugs.      Family History  Family History   Problem Relation Name Age of Onset    Other (Cardiac Disease) Father          Allergies  Iodinated contrast media and Ciprofloxacin    Review of Systems   Constitutional: Negative.    HENT: Negative.     Eyes: Negative.    Respiratory: Negative.     Cardiovascular: Negative.    Gastrointestinal: Negative.    Endocrine: Negative.    Genitourinary: Negative.    Musculoskeletal: Negative.    Skin: Negative.    Allergic/Immunologic: Negative.    Neurological: Negative.    Hematological: Negative.    Psychiatric/Behavioral: Negative.          Physical Exam  Constitutional:       Comments: Mildly sedated post procedure, no distress   HENT:      Head: Normocephalic and atraumatic.      Nose: Nose normal.      Mouth/Throat:      Mouth: Mucous membranes are moist.   Eyes:      Pupils: Pupils are equal, round, and reactive to light.   Cardiovascular:      Rate and Rhythm: Normal rate and regular rhythm.      Heart sounds: No murmur heard.     No gallop.      Comments: Right groin with small amount of venous oozing, controlled with manual pressure, no hematoma  Left groin is soft, no hematoma, no further oozing following application of manual pressure, distal pulses easily palpable  Pulmonary:      Effort: Pulmonary effort is normal.      Breath sounds: Normal breath sounds.   Abdominal:      General: Bowel sounds are normal.      Palpations: Abdomen is soft.      Tenderness: There is no abdominal tenderness. There is no guarding.   Musculoskeletal:         General: Normal range of motion.      Cervical back: Normal range of motion.   Skin:     General: Skin is warm and dry.   Neurological:      General: No focal deficit present.      Mental Status: She is oriented to person, place, and time.   Psychiatric:         Mood and Affect: Mood normal.          Last Recorded Vitals  Blood pressure 141/77, pulse 76, temperature 36 °C (96.8 °F), temperature source Temporal, resp. rate 18,  "height 1.6 m (5' 3\"), weight 121 kg (265 lb 10.5 oz), last menstrual period 09/14/2023, SpO2 99 %.    Relevant Results  Results for orders placed or performed during the hospital encounter of 10/12/23 (from the past 24 hour(s))   Type and screen   Result Value Ref Range    ABO TYPE A     Rh TYPE POS     ANTIBODY SCREEN NEG    hCG, quantitative, pregnancy   Result Value Ref Range    HCG, Beta-Quantitative <2 <5 mIU/mL   Prepare RBC: 2 Units   Result Value Ref Range    PRODUCT CODE C2896R87     Unit Number [id number]     Unit ABO A     Unit RH POS     XM INTEP COMP     Dispense Status XM     Blood Expiration Date November 04, 2023 23:59 EDT     PRODUCT BLOOD TYPE 6200     UNIT VOLUME 350     PRODUCT CODE E3287X68     Unit Number [id number]     Unit ABO A     Unit RH POS     XM INTEP COMP     Dispense Status XM     Blood Expiration Date November 04, 2023 23:59 EDT     PRODUCT BLOOD TYPE 6200     UNIT VOLUME 350    Protime-INR   Result Value Ref Range    Protime 15.4 (H) 9.8 - 12.8 seconds    INR 1.4 (H) 0.9 - 1.1   APTT   Result Value Ref Range    aPTT 29 27 - 38 seconds   CBC   Result Value Ref Range    WBC 17.3 (H) 4.4 - 11.3 x10*3/uL    nRBC 0.0 0.0 - 0.0 /100 WBCs    RBC 3.70 (L) 4.00 - 5.20 x10*6/uL    Hemoglobin 9.5 (L) 12.0 - 16.0 g/dL    Hematocrit 31.3 (L) 36.0 - 46.0 %    MCV 85 80 - 100 fL    MCH 25.7 (L) 26.0 - 34.0 pg    MCHC 30.4 (L) 32.0 - 36.0 g/dL    RDW 17.6 (H) 11.5 - 14.5 %    Platelets 260 150 - 450 x10*3/uL    MPV 10.2 7.5 - 11.5 fL         Assessment/Plan     Intraprocedural bleeding  Admitted for elective cryoablation, on Eliquis anticoagulation, left femoral artery accessed, difficult to control bleeding once the sheath was removed.  Given Kcentra, manual pressure applied to access site with resolution of bleeding.  Mild elevation of INR at 1.4, hemoglobin 10.4 on admission, currently 9.5.  Patient seen and examined in conjunction with Dr. Segovia, recommend close surveillance in ICU, " obtain arterial duplex to rule out AV fistula/pseudoaneurysm, bedrest with leg straight and head of bed flat for 6 hours postprocedure.  Eliquis to remain on hold today.    I spent 60 minutes in the professional and overall care of this patient.

## 2023-10-12 NOTE — ANESTHESIA PREPROCEDURE EVALUATION
Patient: Joyce Carmichael    Procedure Information       Date/Time: 10/12/23 0800    Procedure: ABLATION A-FIB CRYO    Location: [facility]    Providers: Ebony Luis MD            Relevant Problems   Cardiovascular   (+) Atrial fibrillation (CMS/HCC)   (+) Primary hypertension      Endocrine   (+) Obesity due to excess calories      Pulmonary   (+) NASREEN (obstructive sleep apnea)      Hematology   (+) Anemia       Clinical information reviewed:                   NPO Detail:  NPO/Void Status  Carbonhydrate Drink Given Prior to Surgery? : Y  Date of Last Liquid: 10/11/23  Time of Last Liquid: 0900  Date of Last Solid: 10/10/23  Time of Last Solid: 2330  Last Intake Type: Clear fluids  Time of Last Void: 0900         Physical Exam    Airway  Mallampati: II  TM distance: >3 FB  Neck ROM: full     Cardiovascular    Dental - normal exam     Pulmonary    Abdominal          Anesthesia Plan    ASA 3     general   (A-line)  The patient is not a current smoker.    intravenous induction   Anesthetic plan and risks discussed with patient.    Plan discussed with CRNA.

## 2023-10-12 NOTE — Clinical Note
Patient ID band present and verified. Patient contact is in family room. Contact(s) present: brother. Contact name: Daron Rangel. Contact # : [phone]. Contact is permitted to be contacted to discuss results.

## 2023-10-12 NOTE — Clinical Note
Dry, sterile, transparent dressing applied with quick clot to rt. Femerol site.  Rt groin soft without hematoma

## 2023-10-12 NOTE — Clinical Note
SURGEON:  Caryn Orellana MD    PREOPERATIVE DIAGNOSIS:    1.  History of end-stage lung disease, status post bilateral lung transplant.  2.  Chronic left sided Aspergillus empyema.    POSTOPERATIVE DIAGNOSIS:    1.  History of end-stage lung disease, status post bilateral lung transplant.  2.  Chronic left sided aspergillus empyema.    PROCEDURES PERFORMED:  This was a combined procedure performed by Interventional Radiology, Dr. Meléndez  and myself, Dr. Yon Orellana.  We performed a minimally invasive intrapleural antibiotic bead placement, the access was obtained by interventional radiology.  Please see separate dictation for that part of the operation.  This was an off label procedure.    COMPLICATIONS:  None.     ESTIMATED BLOOD LOSS:  Minimal.    DESCRIPTION OF PROCEDURE IN DETAIL:  The patient was taken to the interventional radiology suite and after Dr. Meléndez obtained access to the left pleural space.  We had a 16-[place]icaraguan peel-away sheath.  We then used posaconazole 40 mg per mL as enteral solution and mixed with 5 mL of Stimulan.  This provided a solution that was used to form the intrapleural antibiotic beads.  Once the beads were solidified, we used the sheath to introduce them under fluoroscopic and CT guidance. After the intrapleural beads were placed, we had a confirmation CT scan, which revealed adequate placement.  The peel-away sheath was removed and the procedure was terminated.  All instrument and sponge counts were correct at the end of the case.    Caryn Orellana MD        D: 2021   T: 2021   MT: [id number]    Name:     SARAI KILLIAN  MRN:      [id number]        Account:        [id number]   :      1962     Document: [id number]     Transseptal puncture performed.

## 2023-10-13 ENCOUNTER — APPOINTMENT (OUTPATIENT)
Dept: CARDIOLOGY | Facility: HOSPITAL | Age: 48
DRG: 908 | End: 2023-10-13
Payer: COMMERCIAL

## 2023-10-13 VITALS
WEIGHT: 265.65 LBS | OXYGEN SATURATION: 99 % | TEMPERATURE: 97.5 F | BODY MASS INDEX: 47.07 KG/M2 | RESPIRATION RATE: 16 BRPM | HEART RATE: 82 BPM | SYSTOLIC BLOOD PRESSURE: 119 MMHG | HEIGHT: 63 IN | DIASTOLIC BLOOD PRESSURE: 66 MMHG

## 2023-10-13 LAB
ANION GAP SERPL CALC-SCNC: 11 MMOL/L (ref 10–20)
ATRIAL RATE: 68 BPM
ATRIAL RATE: 74 BPM
BUN SERPL-MCNC: 16 MG/DL (ref 6–23)
CALCIUM SERPL-MCNC: 8.5 MG/DL (ref 8.6–10.3)
CHLORIDE SERPL-SCNC: 103 MMOL/L (ref 98–107)
CO2 SERPL-SCNC: 25 MMOL/L (ref 21–32)
CREAT SERPL-MCNC: 0.65 MG/DL (ref 0.5–1.05)
ERYTHROCYTE [DISTWIDTH] IN BLOOD BY AUTOMATED COUNT: 17.8 % (ref 11.5–14.5)
ERYTHROCYTE [DISTWIDTH] IN BLOOD BY AUTOMATED COUNT: 17.9 % (ref 11.5–14.5)
FERRITIN SERPL-MCNC: 18 NG/ML (ref 8–150)
GFR SERPL CREATININE-BSD FRML MDRD: >90 ML/MIN/1.73M*2
GLUCOSE SERPL-MCNC: 136 MG/DL (ref 74–99)
HCT VFR BLD AUTO: 24.9 % (ref 36–46)
HCT VFR BLD AUTO: 26.3 % (ref 36–46)
HGB BLD-MCNC: 7.3 G/DL (ref 12–16)
HGB BLD-MCNC: 7.6 G/DL (ref 12–16)
IRON SATN MFR SERPL: 6 %
IRON SERPL-MCNC: 20 UG/DL (ref 35–150)
MAGNESIUM SERPL-MCNC: 2.02 MG/DL (ref 1.6–2.4)
MCH RBC QN AUTO: 24.7 PG (ref 26–34)
MCH RBC QN AUTO: 25.1 PG (ref 26–34)
MCHC RBC AUTO-ENTMCNC: 28.9 G/DL (ref 32–36)
MCHC RBC AUTO-ENTMCNC: 29.3 G/DL (ref 32–36)
MCV RBC AUTO: 85 FL (ref 80–100)
MCV RBC AUTO: 86 FL (ref 80–100)
NRBC BLD-RTO: 0 /100 WBCS (ref 0–0)
NRBC BLD-RTO: 0 /100 WBCS (ref 0–0)
P AXIS: 63 DEGREES
P AXIS: 67 DEGREES
P OFFSET: 201 MS
P OFFSET: 202 MS
P ONSET: 147 MS
P ONSET: 149 MS
PLATELET # BLD AUTO: 232 X10*3/UL (ref 150–450)
PLATELET # BLD AUTO: 238 X10*3/UL (ref 150–450)
PMV BLD AUTO: 10.4 FL (ref 7.5–11.5)
PMV BLD AUTO: 10.7 FL (ref 7.5–11.5)
POTASSIUM SERPL-SCNC: 3.9 MMOL/L (ref 3.5–5.3)
PR INTERVAL: 142 MS
PR INTERVAL: 144 MS
Q ONSET: 219 MS
Q ONSET: 220 MS
QRS COUNT: 11 BEATS
QRS COUNT: 13 BEATS
QRS DURATION: 80 MS
QRS DURATION: 82 MS
QT INTERVAL: 426 MS
QT INTERVAL: 434 MS
QTC CALCULATION(BAZETT): 452 MS
QTC CALCULATION(BAZETT): 481 MS
QTC FREDERICIA: 444 MS
QTC FREDERICIA: 465 MS
R AXIS: 13 DEGREES
R AXIS: 25 DEGREES
RBC # BLD AUTO: 2.91 X10*6/UL (ref 4–5.2)
RBC # BLD AUTO: 3.08 X10*6/UL (ref 4–5.2)
SODIUM SERPL-SCNC: 135 MMOL/L (ref 136–145)
T AXIS: 43 DEGREES
T AXIS: 68 DEGREES
T OFFSET: 432 MS
T OFFSET: 437 MS
THROMBIN TIME: 16.3 SECONDS (ref 10.3–16.6)
TIBC SERPL-MCNC: 362 UG/DL
TSH SERPL-ACNC: 1.24 MIU/L (ref 0.44–3.98)
UIBC SERPL-MCNC: 342 UG/DL (ref 110–370)
VENTRICULAR RATE: 68 BPM
VENTRICULAR RATE: 74 BPM
WBC # BLD AUTO: 10.5 X10*3/UL (ref 4.4–11.3)
WBC # BLD AUTO: 9.2 X10*3/UL (ref 4.4–11.3)

## 2023-10-13 PROCEDURE — 83735 ASSAY OF MAGNESIUM: CPT | Performed by: NURSE PRACTITIONER

## 2023-10-13 PROCEDURE — 36415 COLL VENOUS BLD VENIPUNCTURE: CPT | Performed by: NURSE PRACTITIONER

## 2023-10-13 PROCEDURE — 85027 COMPLETE CBC AUTOMATED: CPT | Performed by: NURSE PRACTITIONER

## 2023-10-13 PROCEDURE — 99233 SBSQ HOSP IP/OBS HIGH 50: CPT

## 2023-10-13 PROCEDURE — 85027 COMPLETE CBC AUTOMATED: CPT

## 2023-10-13 PROCEDURE — 99232 SBSQ HOSP IP/OBS MODERATE 35: CPT | Performed by: INTERNAL MEDICINE

## 2023-10-13 PROCEDURE — 2500000004 HC RX 250 GENERAL PHARMACY W/ HCPCS (ALT 636 FOR OP/ED): Performed by: NURSE PRACTITIONER

## 2023-10-13 PROCEDURE — 80048 BASIC METABOLIC PNL TOTAL CA: CPT | Performed by: NURSE PRACTITIONER

## 2023-10-13 PROCEDURE — 99232 SBSQ HOSP IP/OBS MODERATE 35: CPT | Performed by: NURSE PRACTITIONER

## 2023-10-13 PROCEDURE — 93010 ELECTROCARDIOGRAM REPORT: CPT | Performed by: INTERNAL MEDICINE

## 2023-10-13 PROCEDURE — 93005 ELECTROCARDIOGRAM TRACING: CPT

## 2023-10-13 PROCEDURE — 84443 ASSAY THYROID STIM HORMONE: CPT | Performed by: NURSE PRACTITIONER

## 2023-10-13 PROCEDURE — 82728 ASSAY OF FERRITIN: CPT | Mod: CMCLAB,ELYLAB | Performed by: NURSE PRACTITIONER

## 2023-10-13 PROCEDURE — 83550 IRON BINDING TEST: CPT | Performed by: NURSE PRACTITIONER

## 2023-10-13 PROCEDURE — 36415 COLL VENOUS BLD VENIPUNCTURE: CPT

## 2023-10-13 PROCEDURE — 2500000001 HC RX 250 WO HCPCS SELF ADMINISTERED DRUGS (ALT 637 FOR MEDICARE OP): Performed by: NURSE PRACTITIONER

## 2023-10-13 PROCEDURE — 3490 HC RX 250 GENERAL PHARMACY W/ HCPCS (ALT 636 FOR OP/ED): Performed by: NURSE PRACTITIONER

## 2023-10-13 RX ADMIN — DOFETILIDE 250 MCG: 0.25 CAPSULE ORAL at 08:20

## 2023-10-13 RX ADMIN — LEVOTHYROXINE SODIUM 75 MCG: 75 TABLET ORAL at 06:49

## 2023-10-13 RX ADMIN — MAGNESIUM OXIDE 400 MG (241.3 MG MAGNESIUM) TABLET 800 MG: TABLET at 08:20

## 2023-10-13 RX ADMIN — FAMOTIDINE 40 MG: 20 TABLET, FILM COATED ORAL at 08:20

## 2023-10-13 RX ADMIN — LORATADINE 10 MG: 10 TABLET ORAL at 08:20

## 2023-10-13 RX ADMIN — FERROUS SULFATE TAB 325 MG (65 MG ELEMENTAL FE) 65 MG OF IRON: 325 (65 FE) TAB at 08:20

## 2023-10-13 SDOH — SOCIAL STABILITY: SOCIAL INSECURITY: ABUSE: ADULT

## 2023-10-13 SDOH — SOCIAL STABILITY: SOCIAL INSECURITY: ARE YOU OR HAVE YOU BEEN THREATENED OR ABUSED PHYSICALLY, EMOTIONALLY, OR SEXUALLY BY ANYONE?: NO

## 2023-10-13 SDOH — SOCIAL STABILITY: SOCIAL INSECURITY: HAVE YOU HAD THOUGHTS OF HARMING ANYONE ELSE?: NO

## 2023-10-13 SDOH — SOCIAL STABILITY: SOCIAL INSECURITY: HAS ANYONE EVER THREATENED TO HURT YOUR FAMILY OR YOUR PETS?: NO

## 2023-10-13 SDOH — SOCIAL STABILITY: SOCIAL INSECURITY: ARE THERE ANY APPARENT SIGNS OF INJURIES/BEHAVIORS THAT COULD BE RELATED TO ABUSE/NEGLECT?: NO

## 2023-10-13 SDOH — SOCIAL STABILITY: SOCIAL INSECURITY: DO YOU FEEL UNSAFE GOING BACK TO THE PLACE WHERE YOU ARE LIVING?: NO

## 2023-10-13 SDOH — SOCIAL STABILITY: SOCIAL INSECURITY: DO YOU FEEL ANYONE HAS EXPLOITED OR TAKEN ADVANTAGE OF YOU FINANCIALLY OR OF YOUR PERSONAL PROPERTY?: NO

## 2023-10-13 SDOH — SOCIAL STABILITY: SOCIAL INSECURITY: DOES ANYONE TRY TO KEEP YOU FROM HAVING/CONTACTING OTHER FRIENDS OR DOING THINGS OUTSIDE YOUR HOME?: NO

## 2023-10-13 ASSESSMENT — PAIN SCALES - GENERAL
PAINLEVEL_OUTOF10: 0 - NO PAIN

## 2023-10-13 ASSESSMENT — COGNITIVE AND FUNCTIONAL STATUS - GENERAL
PATIENT BASELINE BEDBOUND: NO
MOBILITY SCORE: 24
MOBILITY SCORE: 24
DAILY ACTIVITIY SCORE: 24
DAILY ACTIVITIY SCORE: 24

## 2023-10-13 ASSESSMENT — PATIENT HEALTH QUESTIONNAIRE - PHQ9
2. FEELING DOWN, DEPRESSED OR HOPELESS: NOT AT ALL
SUM OF ALL RESPONSES TO PHQ9 QUESTIONS 1 & 2: 0
1. LITTLE INTEREST OR PLEASURE IN DOING THINGS: NOT AT ALL

## 2023-10-13 ASSESSMENT — PAIN - FUNCTIONAL ASSESSMENT
PAIN_FUNCTIONAL_ASSESSMENT: 0-10

## 2023-10-13 ASSESSMENT — ACTIVITIES OF DAILY LIVING (ADL)
BATHING: INDEPENDENT
HEARING - RIGHT EAR: FUNCTIONAL
WALKS IN HOME: INDEPENDENT
GROOMING: INDEPENDENT
PATIENT'S MEMORY ADEQUATE TO SAFELY COMPLETE DAILY ACTIVITIES?: YES
PATIENT'S MEMORY ADEQUATE TO SAFELY COMPLETE DAILY ACTIVITIES?: YES
DRESSING YOURSELF: INDEPENDENT
HEARING - LEFT EAR: FUNCTIONAL
JUDGMENT_ADEQUATE_SAFELY_COMPLETE_DAILY_ACTIVITIES: YES
HEARING - RIGHT EAR: FUNCTIONAL
BATHING: INDEPENDENT
LACK_OF_TRANSPORTATION: NO
FEEDING YOURSELF: INDEPENDENT
TOILETING: INDEPENDENT
FEEDING YOURSELF: INDEPENDENT
ADEQUATE_TO_COMPLETE_ADL: YES
JUDGMENT_ADEQUATE_SAFELY_COMPLETE_DAILY_ACTIVITIES: YES
GROOMING: INDEPENDENT
HEARING - LEFT EAR: FUNCTIONAL
TOILETING: INDEPENDENT
WALKS IN HOME: INDEPENDENT
ADEQUATE_TO_COMPLETE_ADL: YES
DRESSING YOURSELF: INDEPENDENT
LACK_OF_TRANSPORTATION: NO

## 2023-10-13 ASSESSMENT — LIFESTYLE VARIABLES
HOW OFTEN DO YOU HAVE A DRINK CONTAINING ALCOHOL: NEVER
SKIP TO QUESTIONS 9-10: 1
HOW MANY STANDARD DRINKS CONTAINING ALCOHOL DO YOU HAVE ON A TYPICAL DAY: PATIENT DOES NOT DRINK
AUDIT-C TOTAL SCORE: 0
HOW OFTEN DO YOU HAVE 6 OR MORE DRINKS ON ONE OCCASION: NEVER
AUDIT-C TOTAL SCORE: 0

## 2023-10-13 NOTE — PROGRESS NOTES
Subjective Data:  Feels good  Ready to go home     Objective Data:  Last Recorded Vitals:  Vitals:    10/13/23 1000 10/13/23 1100 10/13/23 1200 10/13/23 1300   BP: 136/67 126/60 124/57 140/77   Pulse: 74 76 74 76   Resp: 18 15 17 18   Temp: 36.4 °C (97.5 °F)      TempSrc: Temporal      SpO2: 100% 98% 98% 100%   Weight:       Height:           Last Labs:      Results for orders placed or performed during the hospital encounter of 10/12/23 (from the past 96 hour(s))   Type and screen   Result Value Ref Range    ABO TYPE A     Rh TYPE POS     ANTIBODY SCREEN NEG    hCG, quantitative, pregnancy   Result Value Ref Range    HCG, Beta-Quantitative <2 <5 mIU/mL   Prepare RBC: 2 Units   Result Value Ref Range    PRODUCT CODE W2632S56     Unit Number [id number]     Unit ABO A     Unit RH POS     XM INTEP COMP     Dispense Status XM     Blood Expiration Date November 04, 2023 23:59 EDT     PRODUCT BLOOD TYPE 6200     UNIT VOLUME 350     PRODUCT CODE V5644Q29     Unit Number [id number]     Unit ABO A     Unit RH POS     XM INTEP COMP     Dispense Status XM     Blood Expiration Date November 04, 2023 23:59 EDT     PRODUCT BLOOD TYPE 6200     UNIT VOLUME 350    Protime-INR   Result Value Ref Range    Protime 15.4 (H) 9.8 - 12.8 seconds    INR 1.4 (H) 0.9 - 1.1   APTT   Result Value Ref Range    aPTT 29 27 - 38 seconds   CBC   Result Value Ref Range    WBC 17.3 (H) 4.4 - 11.3 x10*3/uL    nRBC 0.0 0.0 - 0.0 /100 WBCs    RBC 3.70 (L) 4.00 - 5.20 x10*6/uL    Hemoglobin 9.5 (L) 12.0 - 16.0 g/dL    Hematocrit 31.3 (L) 36.0 - 46.0 %    MCV 85 80 - 100 fL    MCH 25.7 (L) 26.0 - 34.0 pg    MCHC 30.4 (L) 32.0 - 36.0 g/dL    RDW 17.6 (H) 11.5 - 14.5 %    Platelets 260 150 - 450 x10*3/uL    MPV 10.2 7.5 - 11.5 fL   Thrombin time   Result Value Ref Range    Thrombin Time 16.3 10.3 - 16.6 seconds   ECG 12 Lead   Result Value Ref Range    Ventricular Rate 74 BPM    Atrial Rate 74 BPM    TN Interval 142 ms    QRS Duration 80 ms    QT  Interval 434 ms    QTC Calculation(Bazett) 481 ms    P Axis 63 degrees    R Axis 13 degrees    T Axis 43 degrees    QRS Count 13 beats    Q Onset 220 ms    P Onset 149 ms    P Offset 202 ms    T Offset 437 ms    QTC Fredericia 465 ms   Hemoglobin and hematocrit, blood   Result Value Ref Range    Hemoglobin 8.8 (L) 12.0 - 16.0 g/dL    Hematocrit 29.5 (L) 36.0 - 46.0 %   Lipid panel   Result Value Ref Range    Cholesterol 154 0 - 199 mg/dL    HDL-Cholesterol 40.4 mg/dL    Cholesterol/HDL Ratio 3.8     LDL Calculated 88 <=99 mg/dL    VLDL 26 0 - 40 mg/dL    Triglycerides 129 0 - 149 mg/dL    Non HDL Cholesterol 114 0 - 149 mg/dL   Magnesium   Result Value Ref Range    Magnesium 2.02 1.60 - 2.40 mg/dL   CBC   Result Value Ref Range    WBC 10.5 4.4 - 11.3 x10*3/uL    nRBC 0.0 0.0 - 0.0 /100 WBCs    RBC 2.91 (L) 4.00 - 5.20 x10*6/uL    Hemoglobin 7.3 (L) 12.0 - 16.0 g/dL    Hematocrit 24.9 (L) 36.0 - 46.0 %    MCV 86 80 - 100 fL    MCH 25.1 (L) 26.0 - 34.0 pg    MCHC 29.3 (L) 32.0 - 36.0 g/dL    RDW 17.8 (H) 11.5 - 14.5 %    Platelets 238 150 - 450 x10*3/uL    MPV 10.4 7.5 - 11.5 fL   Basic Metabolic Panel   Result Value Ref Range    Glucose 136 (H) 74 - 99 mg/dL    Sodium 135 (L) 136 - 145 mmol/L    Potassium 3.9 3.5 - 5.3 mmol/L    Chloride 103 98 - 107 mmol/L    Bicarbonate 25 21 - 32 mmol/L    Anion Gap 11 10 - 20 mmol/L    Urea Nitrogen 16 6 - 23 mg/dL    Creatinine 0.65 0.50 - 1.05 mg/dL    eGFR >90 >60 mL/min/1.73m*2    Calcium 8.5 (L) 8.6 - 10.3 mg/dL   TSH   Result Value Ref Range    Thyroid Stimulating Hormone 1.24 0.44 - 3.98 mIU/L   Iron and TIBC   Result Value Ref Range    Iron 20 (L) 35 - 150 ug/dL    UIBC 342 110 - 370 ug/dL    TIBC 362 ug/dL    % Saturation 6 %   Ferritin   Result Value Ref Range    Ferritin 18 8 - 150 ng/mL   ECG 12 Lead   Result Value Ref Range    Ventricular Rate 68 BPM    Atrial Rate 68 BPM    PA Interval 144 ms    QRS Duration 82 ms    QT Interval 426 ms    QTC Calculation(Bazett) 452  ms    P Axis 67 degrees    R Axis 25 degrees    T Axis 68 degrees    QRS Count 11 beats    Q Onset 219 ms    P Onset 147 ms    P Offset 201 ms    T Offset 432 ms    QTC Fredericia 444 ms   CBC   Result Value Ref Range    WBC 9.2 4.4 - 11.3 x10*3/uL    nRBC 0.0 0.0 - 0.0 /100 WBCs    RBC 3.08 (L) 4.00 - 5.20 x10*6/uL    Hemoglobin 7.6 (L) 12.0 - 16.0 g/dL    Hematocrit 26.3 (L) 36.0 - 46.0 %    MCV 85 80 - 100 fL    MCH 24.7 (L) 26.0 - 34.0 pg    MCHC 28.9 (L) 32.0 - 36.0 g/dL    RDW 17.9 (H) 11.5 - 14.5 %    Platelets 232 150 - 450 x10*3/uL    MPV 10.7 7.5 - 11.5 fL         Last I/O:  I/O last 3 completed shifts:  In: 1356 (11.3 mL/kg) [I.V.:1356 (11.3 mL/kg)]  Out: 1850 (15.4 mL/kg) [Urine:1800 (0.4 mL/kg/hr); Blood:50]  Weight: 120.5 kg     Inpatient Medications:  Scheduled medications   Medication Dose Route Frequency    dofetilide  250 mcg oral BID    famotidine  40 mg oral Daily    ferrous sulfate  65 mg of iron oral Daily with breakfast    fluticasone furoate-vilanteroL  1 puff inhalation Daily    levothyroxine  75 mcg oral Daily before breakfast    lidocaine PF  0.1 mL subcutaneous Once    loratadine  10 mg oral Daily    losartan  25 mg oral Daily    magnesium oxide  800 mg oral Daily    metoprolol tartrate  50 mg oral BID    oxygen   inhalation Continuous - 02/gases    PARoxetine  40 mg oral Daily     PRN medications   Medication    acetaminophen    albuterol    dilTIAZem    morphine    ondansetron     Continuous Medications   Medication Dose Last Rate    lactated Ringer's  100 mL/hr         Physical Exam:  Constitutional:  awake and alert  Neuro: orientated x 3  Pulmonary: lungs clear bilat  Heart:  S1S2 with regular rate and rhythm.    Extremities:  bilateral femoral sites dressings removed.  Right side stitch removed - no hematoma or bleeding noted.  Left no hematoma or bleeding.  Bilateral sites with a small amount of ecchymosis.  Bilateral pedal pulses 2 +     Assessment/Plan    Persistent atrial  fibrillation s/p cryoablation with left femoral artery accessed- additional time post op to control bleeding after sheath removal and administration of Kcentra  Anemia    Plan:  Ok to discharge home  Will hold eliquis for 1 more day and ok to resume on 10/14/23  Continue other home medications  Post procedure potential complication, activity restriction, medication, and follow up reviewed with patient  Follow up as scheduled.    Site Assessment Clean;Dry;Intact 10/13/23 0800   Dressing Status Clean;Dry 10/13/23 0800   Number of days: 1       Code Status:  Full Code          Carey Pike, APRN-CNP      I have personally reviewed the data.    In addition,  The patient feels good today.  She's ready to go home.    Exam  VS reviewed.  Cor   Regular  Lungs  CTA    Personally reviewed ECG and tele    Tele  No AF    Impression  Paroxysmal AF, s/p cryo PVI  OK to discharge home  Meds per MAR. Add iron.  Outpt ep followup  Greater than 50% visit spent to discuss arrhythmia, treatment options, and management plan.

## 2023-10-13 NOTE — DISCHARGE SUMMARY
Discharge Diagnosis  Unspecified atrial fibrillation (CMS/HCC)    Issues Requiring Follow-Up  AFIB  Intra-procedural bleeding requiring prolonged HD monitoring and frequent vascular checks     Test Results Pending At Discharge  Pending Labs       No current pending labs.            Hospital Course  She is being admitted to ICU after elective cryoablation procedure on 10/12/23 d/t  arterial puncture instead of venous puncture and higher r/f bleeding and hematoma d/t long term anticoagulation with Eliquis. She was transported with Femostop in place.      Upon arrival to ICU, pt was supine, hypertensive, AAO, NSR, with Spo2 >95% on simple mask. Upon my assessment, femostop had been removed and RN holding manual pressure x 10 min.      10/13  no overnights, no afib. Bilateral fem sites, soft with out hematoma or oozing. Okay to discharge home    Pertinent Physical Exam At Time of Discharge  Physical Exam  Constitutional:       Appearance: Normal appearance. She is normal weight.   HENT:      Nose: Nose normal.      Mouth/Throat:      Mouth: Mucous membranes are moist.      Pharynx: Oropharynx is clear.   Eyes:      Extraocular Movements: Extraocular movements intact.      Conjunctiva/sclera: Conjunctivae normal.      Pupils: Pupils are equal, round, and reactive to light.   Cardiovascular:      Rate and Rhythm: Normal rate and regular rhythm.      Pulses: Normal pulses.      Heart sounds: Normal heart sounds.   Pulmonary:      Effort: Pulmonary effort is normal.      Breath sounds: Normal breath sounds.   Abdominal:      General: Abdomen is flat. Bowel sounds are normal.      Palpations: Abdomen is soft.   Musculoskeletal:         General: Normal range of motion.      Cervical back: Normal range of motion.   Skin:     General: Skin is warm and dry.      Capillary Refill: Capillary refill takes less than 2 seconds.   Neurological:      General: No focal deficit present.      Mental Status: She is alert and oriented to  person, place, and time. Mental status is at baseline.   Psychiatric:         Mood and Affect: Mood normal.         Behavior: Behavior normal.         Thought Content: Thought content normal.         Judgment: Judgment normal.          Home Medications     Medication List      CHANGE how you take these medications     apixaban 5 mg tablet; Commonly known as: Eliquis; Take 1 tablet (5 mg)   by mouth 2 times a day. Hold today and resume in am on 10/14/23; What   changed: additional instructions     CONTINUE taking these medications     Advair Diskus 250-50 mcg/dose diskus inhaler; Generic drug: fluticasone   propion-salmeteroL; INHALE 1 PUFF BY MOUTH TWO TIMES A DAY RINSE MOUTH   AFTER USE   * albuterol 108 (90 Base) MCG/ACT inhaler   * albuterol 90 mcg/actuation inhaler   Allegra Allergy 180 mg tablet; Generic drug: fexofenadine   ALPRAZolam 0.5 mg tablet; Commonly known as: Xanax   carvedilol 12.5 mg tablet; Commonly known as: Coreg   cholecalciferol 25 MCG (1000 UT) tablet; Commonly known as: Vitamin D-3   * dilTIAZem 30 mg immediate release tablet; Commonly known as: Cardizem   * dilTIAZem 30 mg immediate release tablet; Commonly known as: Cardizem   famotidine 40 mg tablet; Commonly known as: Pepcid   ferrous sulfate 325 (65 Fe) MG tablet   levothyroxine 75 mcg tablet; Commonly known as: Synthroid, Levoxyl   losartan 25 mg tablet; Commonly known as: Cozaar   magnesium oxide 400 mg (241.3 mg magnesium) tablet; Commonly known as:   Mag-Ox; TAKE 2 TABLETS BY MOUTH DAILY   metoprolol tartrate 50 mg tablet; Commonly known as: Lopressor; TAKE 1   TABLET BY MOUTH TWO TIMES A DAY   nortriptyline 10 mg capsule; Commonly known as: Pamelor   PARoxetine 40 mg tablet; Commonly known as: Paxil  * This list has 4 medication(s) that are the same as other medications   prescribed for you. Read the directions carefully, and ask your doctor or   other care provider to review them with you.       Outpatient Follow-Up  Future  Appointments   Date Time Provider Department Center   10/20/2023 10:00 AM Ebony Luis MD [id number] [place]   1/9/2024 11:00 AM Ebony Luis MD [id number] [place]   2/20/2024  8:40 AM Ebony Luis MD [id number] [place]       Ryan Bishop, APRN-CNP

## 2023-10-13 NOTE — PROGRESS NOTES
[facility] Critical Care Medicine       Date:  10/13/2023  Patient:  Joyce Carmichael  YOB: 1975  MRN:  [id number]   Admit Date:  10/12/2023    No chief complaint on file.        History of Present Illness:  Joyce Carmichael is a 48 y.o. year old female patient with PMH of obesity, atrial fib, PAF with RVR, HTN, NSVT, NASREEN, obesity, GERD, hypothyroid, IBS, covid recovered, depression, and anxiety. She is being admitted to ICU after elective cryoablation procedure on 10/12/23 d/t  arterial puncture instead of venous puncture and higher r/f bleeding and hematoma d/t long term anticoagulation with Eliquis. She was transported with Femostop in place.     Upon arrival to ICU, pt was supine, hypertensive, AAO, NSR, with Spo2 >95% on simple mask. Upon my assessment, femostop had been removed and RN holding manual pressure x 10 min.          Interval ICU Events:  10/12 s/p cryoablation, L femstop-> Admitted to ICU.   Cardizem gtt stopped at 1800.  10/13  no overnights, no afib. Bilateral fem sites, soft with out hematoma or oozing.     Medical History:  Past Medical History:   Diagnosis Date    Arrhythmia     Hyperlipidemia     Hypertension     Sleep apnea      Past Surgical History:   Procedure Laterality Date    OTHER SURGICAL HISTORY  2019    Lumpectomy    OTHER SURGICAL HISTORY  2019    Ovarian cystectomy    OTHER SURGICAL HISTORY  2022    Cyst excision    OTHER SURGICAL HISTORY  2022    Appendectomy    OTHER SURGICAL HISTORY  2022     section     Medications Prior to Admission   Medication Sig Dispense Refill Last Dose    Advair Diskus 250-50 mcg/dose diskus inhaler INHALE 1 PUFF BY MOUTH TWO TIMES A DAY RINSE MOUTH AFTER USE 60 each 11 10/12/2023    albuterol 90 mcg/actuation inhaler Inhale 2 puffs 4 times a day.       ALPRAZolam (Xanax) 0.5 mg tablet Take 1 tablet (0.5 mg) by mouth once daily.       dilTIAZem (Cardizem) 30 mg immediate release tablet Take 1 tablet (30 mg) by  mouth 2 times a day as needed.   10/12/2023    dilTIAZem (Cardizem) 30 mg immediate release tablet Take 1 tablet (30 mg) by mouth 3 times a day.       ferrous sulfate 325 (65 Fe) MG tablet Take 1 tablet (65 mg of iron) by mouth once daily with a meal.   10/12/2023    levothyroxine (Synthroid, Levoxyl) 75 mcg tablet Take 1 tablet (75 mcg) by mouth once daily.       magnesium oxide (Mag-Ox) 400 mg (241.3 mg magnesium) tablet TAKE 2 TABLETS BY MOUTH DAILY 180 tablet 3 10/12/2023    metoprolol tartrate (Lopressor) 50 mg tablet TAKE 1 TABLET BY MOUTH TWO TIMES A  tablet 3 10/12/2023    nortriptyline (Pamelor) 10 mg capsule Take 3 capsules (30 mg) by mouth once daily at bedtime.       PARoxetine (Paxil) 40 mg tablet Take 1 tablet (40 mg) by mouth once daily.       albuterol 108 (90 Base) MCG/ACT inhaler Inhale 2 puffs every 4 hours if needed for wheezing.   More than a month    apixaban (Eliquis) 5 mg tablet Take 1 tablet (5 mg) by mouth 2 times a day.       carvedilol (Coreg) 12.5 mg tablet Take 1 tablet (12.5 mg) by mouth 2 times a day with meals.       cholecalciferol (Vitamin D-3) 25 MCG (1000 UT) tablet Take 1 tablet (25 mcg) by mouth once daily.       famotidine (Pepcid) 40 mg tablet Take 1 tablet (40 mg) by mouth once daily.       fexofenadine (Allegra Allergy) 180 mg tablet Take 1 tablet (180 mg) by mouth once daily.       losartan (Cozaar) 25 mg tablet Take 1 tablet (25 mg) by mouth once daily.        Iodinated contrast media and Ciprofloxacin  Social History     Tobacco Use    Smoking status: Never    Smokeless tobacco: Never   Vaping Use    Vaping Use: Never used   Substance Use Topics    Alcohol use: Never    Drug use: Never     Family History   Problem Relation Name Age of Onset    Other (Cardiac Disease) Father         Hospital Medications:    lactated Ringer's, 100 mL/hr          Current Facility-Administered Medications:     acetaminophen (Tylenol) tablet 650 mg, 650 mg, oral, q6h PRN, Emilia Driver,  APRN-CNP, 650 mg at 10/12/23 2204    albuterol 90 mcg/actuation inhaler 2 puff, 2 puff, inhalation, q4h PRN, CATINA Barajas    dilTIAZem (Cardizem) immediate release tablet 30 mg, 30 mg, oral, BID PRN, CATINA Barajas    dofetilide (Tikosyn) capsule 250 mcg, 250 mcg, oral, BID, CATINA Barajas, 250 mcg at 10/13/23 0820    famotidine (Pepcid) tablet 40 mg, 40 mg, oral, Daily, CATINA Barajas, 40 mg at 10/13/23 0820    ferrous sulfate 325 (65 Fe) MG tablet 65 mg of iron, 65 mg of iron, oral, Daily with breakfast, CATINA Barajas, 65 mg of iron at 10/13/23 0820    fluticasone furoate-vilanteroL (Breo Ellipta) 200-25 mcg/dose inhaler 1 puff, 1 puff, inhalation, Daily, CATINA Barajas    lactated Ringer's infusion, 100 mL/hr, intravenous, Continuous, Geronimo Espinoza MD    levothyroxine (Synthroid, Levoxyl) tablet 75 mcg, 75 mcg, oral, Daily before breakfast, CATINA Barajas, 75 mcg at 10/13/23 0649    lidocaine PF (Xylocaine) 10 mg/mL (1 %) injection 1 mg, 0.1 mL, subcutaneous, Once, Geronimo Espinoza MD    loratadine (Claritin) tablet 10 mg, 10 mg, oral, Daily, CATINA Barajas, 10 mg at 10/13/23 0820    losartan (Cozaar) tablet 25 mg, 25 mg, oral, Daily, CATINA Barajas, 25 mg at 10/12/23 1513    magnesium oxide (Mag-Ox) tablet 800 mg, 800 mg, oral, Daily, CATINA Barajas, 800 mg at 10/13/23 0820    metoprolol tartrate (Lopressor) tablet 50 mg, 50 mg, oral, BID, CATINA Barajas, 50 mg at 10/12/23 2201    morphine injection 2 mg, 2 mg, intravenous, q4h PRN, CATINA Barajas, 2 mg at 10/12/23 1653    ondansetron (Zofran) injection 4 mg, 4 mg, intravenous, Once PRN, Geronimo Espinoza MD    oxygen (O2) therapy, , inhalation, Continuous - 02/gases, Geronimo Espinoza MD, Start at 10/12/23 1530    PARoxetine (Paxil) tablet 40 mg, 40 mg, oral, Daily, CATINA Barajas, 40 mg at 10/12/23 2202    Facility-Administered Medications Ordered in Other  Encounters:     butamben-tetracaine-benzocaine (Cetacaine) spray, , , PRN, Andi Sanders MD, 3 spray at 10/11/23 1312    fentaNYL PF (Sublimaze) injection, , , PRN, Andi Sanders MD, 50 mcg at 10/12/23 1237    lidocaine (Xylocaine) 2 % gel, , , PRN, Andi Sanders MD, 1 Application at 10/11/23 1313    Physical Exam:    Heart Rate:  [68-92]   Temp:  [36.2 °C (97.2 °F)-36.6 °C (97.9 °F)]   Resp:  [10-27]   BP: (101-145)/(43-68)   SpO2:  [98 %-100 %]     PEEP/CPAP (cm H2O):  [5 cm H20] 5 cm H20    Physical Exam  Vitals reviewed.   Constitutional:       Appearance: Normal appearance. She is normal weight.   HENT:      Head: Normocephalic and atraumatic.      Nose: Nose normal.      Mouth/Throat:      Mouth: Mucous membranes are moist.      Pharynx: Oropharynx is clear.   Eyes:      Extraocular Movements: Extraocular movements intact.      Conjunctiva/sclera: Conjunctivae normal.      Pupils: Pupils are equal, round, and reactive to light.   Cardiovascular:      Rate and Rhythm: Normal rate and regular rhythm.   Pulmonary:      Effort: Pulmonary effort is normal.      Breath sounds: Normal breath sounds.   Abdominal:      General: Abdomen is flat. Bowel sounds are normal.      Palpations: Abdomen is soft.   Musculoskeletal:         General: Normal range of motion.   Skin:     General: Skin is warm and dry.      Capillary Refill: Capillary refill takes less than 2 seconds.   Neurological:      General: No focal deficit present.      Mental Status: She is alert and oriented to person, place, and time. Mental status is at baseline.   Psychiatric:         Mood and Affect: Mood normal.         Behavior: Behavior normal.         Thought Content: Thought content normal.         Judgment: Judgment normal.          Objective:      Intake/Output Summary (Last 24 hours) at 10/13/2023 1206  Last data filed at 10/13/2023 0600  Gross per 24 hour   Intake 556 ml   Output 1600 ml   Net -1044 ml       Results for orders placed  or performed during the hospital encounter of 10/12/23 (from the past 24 hour(s))   Protime-INR   Result Value Ref Range    Protime 15.4 (H) 9.8 - 12.8 seconds    INR 1.4 (H) 0.9 - 1.1   APTT   Result Value Ref Range    aPTT 29 27 - 38 seconds   CBC   Result Value Ref Range    WBC 17.3 (H) 4.4 - 11.3 x10*3/uL    nRBC 0.0 0.0 - 0.0 /100 WBCs    RBC 3.70 (L) 4.00 - 5.20 x10*6/uL    Hemoglobin 9.5 (L) 12.0 - 16.0 g/dL    Hematocrit 31.3 (L) 36.0 - 46.0 %    MCV 85 80 - 100 fL    MCH 25.7 (L) 26.0 - 34.0 pg    MCHC 30.4 (L) 32.0 - 36.0 g/dL    RDW 17.6 (H) 11.5 - 14.5 %    Platelets 260 150 - 450 x10*3/uL    MPV 10.2 7.5 - 11.5 fL   Thrombin time   Result Value Ref Range    Thrombin Time 16.3 10.3 - 16.6 seconds   Hemoglobin and hematocrit, blood   Result Value Ref Range    Hemoglobin 8.8 (L) 12.0 - 16.0 g/dL    Hematocrit 29.5 (L) 36.0 - 46.0 %   Lipid panel   Result Value Ref Range    Cholesterol 154 0 - 199 mg/dL    HDL-Cholesterol 40.4 mg/dL    Cholesterol/HDL Ratio 3.8     LDL Calculated 88 <=99 mg/dL    VLDL 26 0 - 40 mg/dL    Triglycerides 129 0 - 149 mg/dL    Non HDL Cholesterol 114 0 - 149 mg/dL   Magnesium   Result Value Ref Range    Magnesium 2.02 1.60 - 2.40 mg/dL   CBC   Result Value Ref Range    WBC 10.5 4.4 - 11.3 x10*3/uL    nRBC 0.0 0.0 - 0.0 /100 WBCs    RBC 2.91 (L) 4.00 - 5.20 x10*6/uL    Hemoglobin 7.3 (L) 12.0 - 16.0 g/dL    Hematocrit 24.9 (L) 36.0 - 46.0 %    MCV 86 80 - 100 fL    MCH 25.1 (L) 26.0 - 34.0 pg    MCHC 29.3 (L) 32.0 - 36.0 g/dL    RDW 17.8 (H) 11.5 - 14.5 %    Platelets 238 150 - 450 x10*3/uL    MPV 10.4 7.5 - 11.5 fL   Basic Metabolic Panel   Result Value Ref Range    Glucose 136 (H) 74 - 99 mg/dL    Sodium 135 (L) 136 - 145 mmol/L    Potassium 3.9 3.5 - 5.3 mmol/L    Chloride 103 98 - 107 mmol/L    Bicarbonate 25 21 - 32 mmol/L    Anion Gap 11 10 - 20 mmol/L    Urea Nitrogen 16 6 - 23 mg/dL    Creatinine 0.65 0.50 - 1.05 mg/dL    eGFR >90 >60 mL/min/1.73m*2    Calcium 8.5 (L) 8.6  - 10.3 mg/dL   TSH   Result Value Ref Range    Thyroid Stimulating Hormone 1.24 0.44 - 3.98 mIU/L   Iron and TIBC   Result Value Ref Range    Iron 20 (L) 35 - 150 ug/dL    UIBC 342 110 - 370 ug/dL    TIBC 362 ug/dL    % Saturation 6 %   Ferritin   Result Value Ref Range    Ferritin 18 8 - 150 ng/mL   CBC   Result Value Ref Range    WBC 9.2 4.4 - 11.3 x10*3/uL    nRBC 0.0 0.0 - 0.0 /100 WBCs    RBC 3.08 (L) 4.00 - 5.20 x10*6/uL    Hemoglobin 7.6 (L) 12.0 - 16.0 g/dL    Hematocrit 26.3 (L) 36.0 - 46.0 %    MCV 85 80 - 100 fL    MCH 24.7 (L) 26.0 - 34.0 pg    MCHC 28.9 (L) 32.0 - 36.0 g/dL    RDW 17.9 (H) 11.5 - 14.5 %    Platelets 232 150 - 450 x10*3/uL    MPV 10.7 7.5 - 11.5 fL       Recent Imaging  Vascular US lower extremity pseudoaneurysm evaluation duplex left   Final Result   No pseudoaneurysm or fistula is identified.        MACRO:   None.        Signed by: Viridiana Robbins 10/12/2023 4:16 PM   Dictation workstation:   [id number]      Electrophysiology procedure   Final Result            No echocardiogram results found for the past 14 days    Assessment/Plan:    I am currently managing this critically ill patient for:  AFIB s/p cryoablation  Intra-procedural bleeding requiring prolonged HD monitoring and frequent vascular checks    Daily Plan:  AFIb  -EP following  -now NS and HD stable  -card gtt stopped last night  -continue tikosyn  -Restart Eliquis tomorrow per EP    Intra-procedural bleeding requiring prolonged HD monitoring and frequent vascular checks  -bilateral fem sites, soft, no hematoma, no ozzing  -arterial duplex showed no evidence of pseudoaneurysm or fistula  -Hgb stable this afternoon, did drop after procedure  -Vascular s/o  -Restart Eliquis tomorrow per EP     Hypothyroid   -continue synthroid    DVT Prophylaxis: SCDs  GI Prophylaxis: N/a   Bowel Regimen: n/a  Diet: reguar  CVC: n/a  [place]ad: n/a  Gatica: n/a  Restraints: n/a  Code Status: full  Dispo: Okay to discharge home      Critical Care  Time:  45 minutes  Discussed with Dr. Morales, Dr. Luis and CTS  JOEL Ruiz-CNP  [facility] - Critical Care

## 2023-10-13 NOTE — PROGRESS NOTES
"Joyce Carmichael is a 48 y.o. female on day 1 of admission presenting with Unspecified atrial fibrillation (CMS/HCC).    Subjective   No acute overnight events.  No reports of pain at left femoral access site.  Area is soft to touch without hematoma or further oozing.  Distal pulses are intact.  Slight downward drift in hemoglobin to 7.3, defer decision regarding transfusion to primary medical team.  Arterial duplex obtained showing no evidence of pseudoaneurysm or AV fistula.  Stable to downgrade and discharge from vascular surgery standpoint, oral anticoagulation can be resumed at discretion of EP.  No further follow-up needed from vascular surgery.       Objective     Physical Exam  Constitutional:       Appearance: Normal appearance.   HENT:      Head: Normocephalic.      Mouth/Throat:      Mouth: Mucous membranes are moist.   Eyes:      Pupils: Pupils are equal, round, and reactive to light.   Cardiovascular:      Rate and Rhythm: Normal rate and regular rhythm.      Pulses: Normal pulses.      Heart sounds: No murmur heard.     No gallop.      Comments: Left femoral access site is soft, without hematoma, ecchymosis or bruit, distal pulses palpable, right femoral  access site dressing is intact with scant bloody drainage but soft  Pulmonary:      Effort: Pulmonary effort is normal.      Breath sounds: Normal breath sounds.   Abdominal:      General: Bowel sounds are normal.      Palpations: Abdomen is soft.      Tenderness: There is no abdominal tenderness.   Musculoskeletal:         General: Normal range of motion.      Cervical back: Normal range of motion.   Skin:     General: Skin is warm and dry.   Neurological:      General: No focal deficit present.      Mental Status: She is alert and oriented to person, place, and time.   Psychiatric:         Mood and Affect: Mood normal.         Last Recorded Vitals  Blood pressure 136/67, pulse 74, temperature 36.4 °C (97.5 °F), resp. rate 18, height 1.6 m (5' 3\"), " weight 121 kg (265 lb 10.5 oz), last menstrual period 09/14/2023, SpO2 100 %.  Intake/Output last 3 Shifts:  I/O last 3 completed shifts:  In: 1356 (11.3 mL/kg) [I.V.:1356 (11.3 mL/kg)]  Out: 1850 (15.4 mL/kg) [Urine:1800 (0.4 mL/kg/hr); Blood:50]  Weight: 120.5 kg     Relevant Results  Vascular US lower extremity pseudoaneurysm evaluation duplex left    Result Date: 10/12/2023  Interpreted By:  Viridiana Robbins, STUDY: [facility] US LOWER EXTREMITY PSEUDOANEURYSM DUPLEX LEFT;  10/12/2023 3:42 pm   INDICATION: Signs/Symptoms:Left lower extremity only, s/p post procedural arterial bleeding r/o AVF/pseudoaneurysm.   COMPARISON: None.   ACCESSION NUMBER(S): [id number]   ORDERING CLINICIAN: LORNA CERRATO   TECHNIQUE: Grayscale, color Doppler and spectral Doppler imaging   FINDINGS: Imaging of the left groin was performed. Left common femoral artery and vein are patent by color and spectral Doppler. No extraluminal flow is detected. There is no Doppler disturbance to suggest arteriovenous fistula.       No pseudoaneurysm or fistula is identified.   MACRO: None.   Signed by: Viridiana Robbins 10/12/2023 4:16 PM Dictation workstation:   [id number]    Electrophysiology procedure    Result Date: 10/12/2023  .Summary: Successful isolation of 4 pulmonary veins via cryoablation 3D mapping Successful trans-septal catheterization ACT monitoring x 6 Intracardiac echo Ultrasound guided vascular access x 4 Bundle His recording Right ventricular recording and pacing Arterial line Discharge: The patient was in stable condition after PVI completed.  Follow up: The patient will remain in the hospital overnight for telemetry monitoring and observation, with anticipated discharge on the following day. The patient should be alert for bleeding, swelling, or signs of infection. The patient should call the electrophysiologist immediately if symptoms recur, or for any problems. The patient has been instructed accordingly.  With HIPAA consent,  the patient and family (brother via telephone) was contacted. Patient history: Please refer to the detailed history and physical on the patient's medical chart. Diagnosis: Persistent atrial fibrillation Procedure narrative: The risks, benefits, and alternatives to the procedure and sedation were explained to the patient, and informed consent was obtained. The patient was in the fasting state. A baseline ECG was recorded. RF grounding pads were placed. Self-adhesive anterior-posterior defibrillation pads were applied. A ZOLL defibrillator was used for monitoring and the defibrillator waveform was set to biphasic. The patient was set up for continuous monitoring of surface 12 lead ECG and pulse oximetry. Blood pressure was monitored with automatic cuff measurements. The procedure was performed under IV deep sedation, administered by anesthesiology. Heparin was administered as per protocol. Vascular ultrasound was used to guide femoral venous access and arterial access. The right femoral vein was accessed. An 8.5 F sheath was inserted. The left femoral vein was then accessed x 2. A 10 F long sheath was inserted, and a 10 F Duo sheath were inserted into the left femoral vein. A 10 F short sheath was inserted into the left femoral artery. Arterial blood pressure was monitored. A quadripolar catheter was advanced to the RVA. A deflectable catheter was advanced to the His bundle. ACT's were monitored. Heparin bolus prior to trans-septal was administered. A long J tipped exchange wire was advanced through the existing right femoral sheath. The wire was advanced to the SVC. The existing 8 F sheath was exchanged for a LAMP sheath over the wire.  With fluoroscopic and ICE guidance, the Safe Sept system was used to cross the septum. The dilator and sheath were advanced. The sheath was advanced further into the LA, and the dilator and Safe sept system were removed. The sheath was aspirated and connected to record pressure. LA  measurements were appropriate. No pericardial effusion was seen by ICE. A Toray wire was advanced and retained in the left atrium. Heparin bolus was given prior to trans-septal. A Heparin gtt was then initiated and titrated to maintain ACT over 350. The LAMP sheath was removed, and the femoral vein was dilated. A long dilator over the wire 4was used to dilate the trans-septal access. The dilator was removed. A Cryo sheath was advanced over the wire to the left atrium. The wire was removed. Complex procedure for mapping. Additional 90 minutes. The Synthesio mapping catheter was used to map the pulmonary veins. An BOB system was used to create a 3D map of the left atrium and pulmonary veins. See 3D maps. ICE, fluoroscopy, hemodynamic, and esophageal temperature monitoring were performed. IV contrast was hand injected to document the seal of the balloon around each pulmonary vein ostium before application of cryo energy. Using ICE and fluoroscopic guidance, each pulmonary vein was isolated with cryoablation using the 28 mm balloon.  The LSPV, RSPV, LIPV, and RIPV were isolated with 4 applications. The temperatures ranged from -40 to -55 degrees, with maximal duration of cryo energy up to 180 seconds. The esophageal temperature was monitored, and the lowest temperature was 30.5 degrees. Pacing of the right phrenic nerve was performed during the right pulmonary vein isolations. No change in CMAP or diaphragmatic stimulation amplitude occurred during ablation. After ablation, the pulmonary veins were mapped. There were no pulmonary vein potentials in any of the veins post procedure. The catheters were withdrawn. ICE showed no pericardial effusion. Protamine was administered. Sheaths were removed. A Figure of 8 stitch was placed at the right femoral venous site. Hemostasis was achieved with the suture and with manual pressure. Vascades were placed in the left femoral venous sites. Hemostasis was achieved with  Vascades and manual pressure. A Fem stop was used for left femoral arterial site closure. 11.  Kcentra ordered for femoral arterial closure. 12. The patient was extubated and transferred to the MICU in stable condition. Complications: The patient tolerated the procedure without any complications or incident. See procedural log.     XR chest 2 views    Result Date: 10/11/2023  STUDY: Chest Radiographs;  10/11/2023, 11:54AM. INDICATION: Pre op. COMPARISON: CXR 10/12/2022, 6/25/2021. ACCESSION NUMBER(S): [id number] ORDERING CLINICIAN: NELIDA KHAN TECHNIQUE:  Frontal and lateral chest. FINDINGS: CARDIOMEDIASTINAL SILHOUETTE: Cardiomediastinal silhouette is normal in size and configuration.  LUNGS: No regions of airspace consolidation.  Small calcified granuloma left upper lobe measures 0.5 cm and is stable.  No pneumothorax or pleural effusion.  ABDOMEN: No remarkable upper abdominal findings.  BONES: No acute osseous changes.    No regions of airspace consolidation. Signed by John Kang MD        Results for orders placed or performed during the hospital encounter of 10/12/23 (from the past 24 hour(s))   Protime-INR   Result Value Ref Range    Protime 15.4 (H) 9.8 - 12.8 seconds    INR 1.4 (H) 0.9 - 1.1   APTT   Result Value Ref Range    aPTT 29 27 - 38 seconds   CBC   Result Value Ref Range    WBC 17.3 (H) 4.4 - 11.3 x10*3/uL    nRBC 0.0 0.0 - 0.0 /100 WBCs    RBC 3.70 (L) 4.00 - 5.20 x10*6/uL    Hemoglobin 9.5 (L) 12.0 - 16.0 g/dL    Hematocrit 31.3 (L) 36.0 - 46.0 %    MCV 85 80 - 100 fL    MCH 25.7 (L) 26.0 - 34.0 pg    MCHC 30.4 (L) 32.0 - 36.0 g/dL    RDW 17.6 (H) 11.5 - 14.5 %    Platelets 260 150 - 450 x10*3/uL    MPV 10.2 7.5 - 11.5 fL   Thrombin time   Result Value Ref Range    Thrombin Time 16.3 10.3 - 16.6 seconds   Hemoglobin and hematocrit, blood   Result Value Ref Range    Hemoglobin 8.8 (L) 12.0 - 16.0 g/dL    Hematocrit 29.5 (L) 36.0 - 46.0 %   Lipid panel   Result Value Ref Range     Cholesterol 154 0 - 199 mg/dL    HDL-Cholesterol 40.4 mg/dL    Cholesterol/HDL Ratio 3.8     LDL Calculated 88 <=99 mg/dL    VLDL 26 0 - 40 mg/dL    Triglycerides 129 0 - 149 mg/dL    Non HDL Cholesterol 114 0 - 149 mg/dL   Magnesium   Result Value Ref Range    Magnesium 2.02 1.60 - 2.40 mg/dL   CBC   Result Value Ref Range    WBC 10.5 4.4 - 11.3 x10*3/uL    nRBC 0.0 0.0 - 0.0 /100 WBCs    RBC 2.91 (L) 4.00 - 5.20 x10*6/uL    Hemoglobin 7.3 (L) 12.0 - 16.0 g/dL    Hematocrit 24.9 (L) 36.0 - 46.0 %    MCV 86 80 - 100 fL    MCH 25.1 (L) 26.0 - 34.0 pg    MCHC 29.3 (L) 32.0 - 36.0 g/dL    RDW 17.8 (H) 11.5 - 14.5 %    Platelets 238 150 - 450 x10*3/uL    MPV 10.4 7.5 - 11.5 fL   Basic Metabolic Panel   Result Value Ref Range    Glucose 136 (H) 74 - 99 mg/dL    Sodium 135 (L) 136 - 145 mmol/L    Potassium 3.9 3.5 - 5.3 mmol/L    Chloride 103 98 - 107 mmol/L    Bicarbonate 25 21 - 32 mmol/L    Anion Gap 11 10 - 20 mmol/L    Urea Nitrogen 16 6 - 23 mg/dL    Creatinine 0.65 0.50 - 1.05 mg/dL    eGFR >90 >60 mL/min/1.73m*2    Calcium 8.5 (L) 8.6 - 10.3 mg/dL   TSH   Result Value Ref Range    Thyroid Stimulating Hormone 1.24 0.44 - 3.98 mIU/L   Iron and TIBC   Result Value Ref Range    Iron 20 (L) 35 - 150 ug/dL    UIBC 342 110 - 370 ug/dL    TIBC 362 ug/dL    % Saturation 6 %   Ferritin   Result Value Ref Range    Ferritin 18 8 - 150 ng/mL        Current Facility-Administered Medications:     acetaminophen (Tylenol) tablet 650 mg, 650 mg, oral, q6h PRN, Emilia Driver, JOEL-CNP, 650 mg at 10/12/23 2204    albuterol 90 mcg/actuation inhaler 2 puff, 2 puff, inhalation, q4h PRN, CATINA Barajas    dilTIAZem (Cardizem) immediate release tablet 30 mg, 30 mg, oral, BID PRN, CATINA Barajas    dofetilide (Tikosyn) capsule 250 mcg, 250 mcg, oral, BID, CATINA Barajas, 250 mcg at 10/13/23 0820    famotidine (Pepcid) tablet 40 mg, 40 mg, oral, Daily, CATINA Barajas, 40 mg at 10/13/23 0820    ferrous sulfate  325 (65 Fe) MG tablet 65 mg of iron, 65 mg of iron, oral, Daily with breakfast, CATINA Barajas, 65 mg of iron at 10/13/23 0820    fluticasone furoate-vilanteroL (Breo Ellipta) 200-25 mcg/dose inhaler 1 puff, 1 puff, inhalation, Daily, CATINA Barajas    lactated Ringer's infusion, 100 mL/hr, intravenous, Continuous, Geronimo Espinoza MD    levothyroxine (Synthroid, Levoxyl) tablet 75 mcg, 75 mcg, oral, Daily before breakfast, CATINA Barajas, 75 mcg at 10/13/23 0649    lidocaine PF (Xylocaine) 10 mg/mL (1 %) injection 1 mg, 0.1 mL, subcutaneous, Once, Geronimo Espinoza MD    loratadine (Claritin) tablet 10 mg, 10 mg, oral, Daily, CATINA Barajas, 10 mg at 10/13/23 0820    losartan (Cozaar) tablet 25 mg, 25 mg, oral, Daily, CATINA Barajas, 25 mg at 10/12/23 1513    magnesium oxide (Mag-Ox) tablet 800 mg, 800 mg, oral, Daily, CATINA Barajas, 800 mg at 10/13/23 0820    metoprolol tartrate (Lopressor) tablet 50 mg, 50 mg, oral, BID, CATINA Barajas, 50 mg at 10/12/23 2201    morphine injection 2 mg, 2 mg, intravenous, q4h PRN, CATINA Barajas, 2 mg at 10/12/23 1653    ondansetron (Zofran) injection 4 mg, 4 mg, intravenous, Once PRN, Geronimo Espinoza MD    oxygen (O2) therapy, , inhalation, Continuous - 02/gases, Geronimo Espinoza MD, Start at 10/12/23 1530    PARoxetine (Paxil) tablet 40 mg, 40 mg, oral, Daily, CATINA Barajas, 40 mg at 10/12/23 2202    Facility-Administered Medications Ordered in Other Encounters:     butamben-tetracaine-benzocaine (Cetacaine) spray, , , PRN, Andi Sanders MD, 3 spray at 10/11/23 1312    fentaNYL PF (Sublimaze) injection, , , PRN, Andi Sanders MD, 50 mcg at 10/12/23 1237    lidocaine (Xylocaine) 2 % gel, , , PRN, Andi Sanders MD, 1 Application at 10/11/23 1313          Assessment/Plan   Principal Problem:    Unspecified atrial fibrillation (CMS/HCC)  Active Problems:    Atrial fibrillation (CMS/HCC)    Obesity due to  excess calories    NASREEN (obstructive sleep apnea)    Primary hypertension    Anemia    Intraprocedural bleeding  Admitted for elective cryoablation, on Eliquis anticoagulation, left femoral artery accessed, difficult to control bleeding once the sheath was removed.  Given Kcentra, manual pressure applied to access site with resolution of bleeding.  Arterial duplex showing no evidence of pseudoaneurysm or AV fistula, site has remained stable overnight.  Slight downward drift of hemoglobin to 7.3, defer decision regarding transfusion to primary medical team.  Stable from vascular surgery standpoint to transfer and discharge, defer resumption of Eliquis to EP.  No further follow-up from vascular surgery required.       I spent 45 minutes in the professional and overall care of this patient.      Melina Pineda, APRN-CNP

## 2023-10-15 LAB
BLOOD EXPIRATION DATE: NORMAL
BLOOD EXPIRATION DATE: NORMAL
DISPENSE STATUS: NORMAL
DISPENSE STATUS: NORMAL
PRODUCT BLOOD TYPE: 6200
PRODUCT BLOOD TYPE: 6200
PRODUCT CODE: NORMAL
PRODUCT CODE: NORMAL
UNIT ABO: NORMAL
UNIT ABO: NORMAL
UNIT NUMBER: NORMAL
UNIT NUMBER: NORMAL
UNIT RH: NORMAL
UNIT RH: NORMAL
UNIT VOLUME: 350
UNIT VOLUME: 350
XM INTEP: NORMAL
XM INTEP: NORMAL

## 2023-10-16 ENCOUNTER — PATIENT OUTREACH (OUTPATIENT)
Dept: CARE COORDINATION | Facility: CLINIC | Age: 48
End: 2023-10-16
Payer: COMMERCIAL

## 2023-10-16 NOTE — PROGRESS NOTES
Medications  Medications reviewed with patient/caregiver?: Yes (10/16/2023 12:05 PM)  Is the patient having any side effects they believe may be caused by any medication additions or changes?: No (10/16/2023 12:05 PM)  Does the patient have all medications ordered at discharge?: Yes (10/16/2023 12:05 PM)  Care Management Interventions: No intervention needed (10/16/2023 12:05 PM)  Is the patient taking all medications as directed (includes completed medication regime)?: Yes (10/16/2023 12:05 PM)    Appointments  Does the patient have a primary care provider?: Yes (follow up scheduled with EP this Friday) (10/16/2023 12:05 PM)    Patient Teaching  Does the patient have access to their discharge instructions?: Yes (10/16/2023 12:05 PM)  Care Management Interventions: Reviewed instructions with patient (10/16/2023 12:05 PM)  What is the patient's perception of their health status since discharge?: Returned to baseline/stable (10/16/2023 12:05 PM)    Wrap Up  Wrap Up Additional Comments: Joyce reports she is doing well, puncture site is bruised but no bleeding or hematoma.  Has appt scheduled with her EP 10/20.  Reinitiated her Eliquis on Saturday (10/16/2023 12:05 PM)

## 2023-10-19 PROBLEM — I48.0 PAROXYSMAL ATRIAL FIBRILLATION WITH RVR (MULTI): Status: ACTIVE | Noted: 2023-10-19

## 2023-10-19 PROBLEM — N90.89 VULVAR IRRITATION: Status: ACTIVE | Noted: 2023-10-19

## 2023-10-19 PROBLEM — R92.8 ABNORMAL MAMMOGRAM: Status: ACTIVE | Noted: 2023-10-19

## 2023-10-19 PROBLEM — R06.89 DIFFICULTY BREATHING: Status: ACTIVE | Noted: 2023-10-19

## 2023-10-19 PROBLEM — E87.6 HYPOKALEMIA: Status: ACTIVE | Noted: 2023-10-19

## 2023-10-19 PROBLEM — D23.9 PAPILLOMA OF SKIN: Status: ACTIVE | Noted: 2023-10-19

## 2023-10-19 PROBLEM — R73.9 ELEVATED BLOOD SUGAR: Status: ACTIVE | Noted: 2023-10-19

## 2023-10-19 PROBLEM — J04.10 TRACHEITIS: Status: ACTIVE | Noted: 2023-10-19

## 2023-10-19 PROBLEM — F41.9 ANXIETY DISORDER: Status: ACTIVE | Noted: 2023-10-19

## 2023-10-19 PROBLEM — L72.0 EPIDERMAL CYST OF FACE: Status: ACTIVE | Noted: 2023-10-19

## 2023-10-19 PROBLEM — L50.9 URTICARIA: Status: ACTIVE | Noted: 2023-10-19

## 2023-10-19 RX ORDER — FLECAINIDE ACETATE 100 MG/1
1 TABLET ORAL 2 TIMES DAILY
COMMUNITY
Start: 2021-06-28 | End: 2023-10-20 | Stop reason: ALTCHOICE

## 2023-10-19 RX ORDER — DOFETILIDE 0.25 MG/1
1 CAPSULE ORAL 2 TIMES DAILY
COMMUNITY
End: 2023-10-25 | Stop reason: SDUPTHER

## 2023-10-20 ENCOUNTER — OFFICE VISIT (OUTPATIENT)
Dept: CARDIOLOGY | Facility: CLINIC | Age: 48
End: 2023-10-20
Payer: COMMERCIAL

## 2023-10-20 VITALS
WEIGHT: 265 LBS | HEIGHT: 63 IN | DIASTOLIC BLOOD PRESSURE: 82 MMHG | HEART RATE: 53 BPM | SYSTOLIC BLOOD PRESSURE: 134 MMHG | BODY MASS INDEX: 46.95 KG/M2

## 2023-10-20 DIAGNOSIS — E66.01 MORBID OBESITY WITH BMI OF 45.0-49.9, ADULT (MULTI): ICD-10-CM

## 2023-10-20 DIAGNOSIS — Z71.89 ENCOUNTER TO DISCUSS TREATMENT OPTIONS: ICD-10-CM

## 2023-10-20 DIAGNOSIS — Z79.01 CURRENT USE OF LONG TERM ANTICOAGULATION: ICD-10-CM

## 2023-10-20 DIAGNOSIS — E03.9 HYPOTHYROIDISM, UNSPECIFIED TYPE: ICD-10-CM

## 2023-10-20 DIAGNOSIS — Z78.9 NEVER SMOKED ANY SUBSTANCE: ICD-10-CM

## 2023-10-20 DIAGNOSIS — I10 PRIMARY HYPERTENSION: ICD-10-CM

## 2023-10-20 DIAGNOSIS — I48.91 ATRIAL FIBRILLATION, UNSPECIFIED TYPE (MULTI): Primary | ICD-10-CM

## 2023-10-20 DIAGNOSIS — Z71.2 ENCOUNTER TO DISCUSS TEST RESULTS: ICD-10-CM

## 2023-10-20 DIAGNOSIS — Z79.899 HIGH RISK MEDICATION USE: ICD-10-CM

## 2023-10-20 DIAGNOSIS — I48.0 PAROXYSMAL ATRIAL FIBRILLATION WITH RVR (MULTI): ICD-10-CM

## 2023-10-20 DIAGNOSIS — I10 ESSENTIAL HYPERTENSION: ICD-10-CM

## 2023-10-20 DIAGNOSIS — G47.33 OSA (OBSTRUCTIVE SLEEP APNEA): ICD-10-CM

## 2023-10-20 PROCEDURE — 93000 ELECTROCARDIOGRAM COMPLETE: CPT | Performed by: INTERNAL MEDICINE

## 2023-10-20 PROCEDURE — 1036F TOBACCO NON-USER: CPT | Performed by: INTERNAL MEDICINE

## 2023-10-20 PROCEDURE — 3079F DIAST BP 80-89 MM HG: CPT | Performed by: INTERNAL MEDICINE

## 2023-10-20 PROCEDURE — 3075F SYST BP GE 130 - 139MM HG: CPT | Performed by: INTERNAL MEDICINE

## 2023-10-20 PROCEDURE — 99214 OFFICE O/P EST MOD 30 MIN: CPT | Performed by: INTERNAL MEDICINE

## 2023-10-20 PROCEDURE — 3008F BODY MASS INDEX DOCD: CPT | Performed by: INTERNAL MEDICINE

## 2023-10-20 NOTE — PROGRESS NOTES
"Chief Complaint:   Atrial Fibrillation (1 week follow up.)     History Of Present Illness:    Joyce Carmichael is a 48 y.o. female presenting with followup.  She under cryoablation for a fib.  She is doing well post procedure. \"Each day is better.\"  She is very appreciative of care.    She has rare palpitation.  She has no groin hematomas.  She denies any lightheadedness, near syncope, or syncope.    She had some visual changes with bright lights and jagged edges for 30 minutes. I recommended that she see ophthalmology.         Last Recorded Vitals:  Vitals:    10/20/23 1008   BP: 134/82   BP Location: Left arm   Patient Position: Sitting   BP Cuff Size: Adult   Pulse: 53   Weight: 120 kg (265 lb)   Height: 1.6 m (5' 3\")       Past Medical History:  She has a past medical history of Arrhythmia, Hyperlipidemia, Hypertension, and Sleep apnea.    Past Surgical History:  She has a past surgical history that includes Other surgical history (12/19/2019); Other surgical history (12/19/2019); Other surgical history (01/12/2022); Other surgical history (01/12/2022); Other surgical history (01/12/2022); and Cardiac electrophysiology procedure (N/A, 10/12/2023).      Social History:  She reports that she has never smoked. She has never used smokeless tobacco. She reports that she does not drink alcohol and does not use drugs.    Family History:  Family History   Problem Relation Name Age of Onset    Other (cva) Mother      Hypertension Mother      Diabetes type II Mother      Other (Cardiac Disease) Father      Coronary artery disease Father      Hypertension Father      Obesity Father      Diabetes type II Father      No Known Problems Sister      Other (cardiomegaly) Brother      Hypothyroidism Brother          Allergies:  Iodinated contrast media and Ciprofloxacin    Outpatient Medications:  Current Outpatient Medications   Medication Instructions    Advair Diskus 250-50 mcg/dose diskus inhaler INHALE 1 PUFF BY MOUTH TWO TIMES " A DAY RINSE MOUTH AFTER USE    albuterol 108 (90 Base) MCG/ACT inhaler 2 puffs, inhalation, Every 4 hours PRN    apixaban (ELIQUIS) 5 mg, oral, 2 times daily, Hold today and resume in am on 10/14/23    dilTIAZem (CARDIZEM) 30 mg, oral, 3 times daily    dofetilide (Tikosyn) 250 mcg capsule 1 capsule, oral, 2 times daily    famotidine (PEPCID) 40 mg, oral, Daily    ferrous sulfate 325 (65 Fe) MG tablet 65 mg of iron, oral, Daily with breakfast    fexofenadine (Allegra Allergy) 180 mg tablet 1 tablet, oral, Daily    levothyroxine (SYNTHROID, LEVOXYL) 75 mcg, oral, Daily    losartan (COZAAR) 25 mg, oral, As needed    magnesium oxide (Mag-Ox) 400 mg (241.3 mg magnesium) tablet TAKE 2 TABLETS BY MOUTH DAILY    metoprolol tartrate (Lopressor) 50 mg tablet TAKE 1 TABLET BY MOUTH TWO TIMES A DAY    PARoxetine (PAXIL) 40 mg, oral, Daily    potassium chloride (KLOR-CON ORAL) 1 tablet, oral, Daily     Review of Systems:  Review of Systems   Skin:         Slight bruising noted to bilateral groin areas. No stiches in place. Well approximated and no complaints. No thrill, No brui   All other systems reviewed and are negative.     Physical Exam:  Constitutional:  morbidly obese.   Eyes: no erythema, swelling or discharge from the eye .   Neck: neck is supple, symmetric, trachea midline, no masses  and no thyromegaly .   Pulmonary: no increased work of breathing or signs of respiratory distress  and lungs clear to auscultation.    Cardiovascular: carotid pulses 2+ bilaterally with no bruit , JVP was normal, no thrills, regular rhythm, normal S1 and S2, no murmurs , pedal pulses 2+ bilaterally  and no edema.  Abdomen: abdomen non-tender, no masses  and no hepatomegaly .   Skin: skin warm and dry, normal skin turgor.   Psychiatric judgment and insight is normal  and oriented to person, place and time .      Last Labs:  CBC -  Lab Results   Component Value Date    WBC 9.2 10/13/2023    HGB 7.6 (L) 10/13/2023    HCT 26.3 (L) 10/13/2023     MCV 85 10/13/2023     10/13/2023       CMP -  Lab Results   Component Value Date    CALCIUM 8.5 (L) 10/13/2023    PROT 8.2 10/11/2023    ALBUMIN 4.1 10/11/2023    AST 18 10/11/2023    ALT 15 10/11/2023    ALKPHOS 81 10/11/2023    BILITOT 0.6 10/11/2023       LIPID PANEL -   Lab Results   Component Value Date    CHOL 154 10/12/2023    TRIG 129 10/12/2023    HDL 40.4 10/12/2023    CHHDL 3.8 10/12/2023    LDLF 99 06/02/2022    VLDL 26 10/12/2023    NHDL 114 10/12/2023       RENAL FUNCTION PANEL -   Lab Results   Component Value Date    GLUCOSE 136 (H) 10/13/2023     (L) 10/13/2023    K 3.9 10/13/2023     10/13/2023    CO2 25 10/13/2023    ANIONGAP 11 10/13/2023    BUN 16 10/13/2023    CREATININE 0.65 10/13/2023    CALCIUM 8.5 (L) 10/13/2023    ALBUMIN 4.1 10/11/2023        Lab Results   Component Value Date     (H) 10/12/2022    HGBA1C 5.5 06/02/2022       Last Cardiology Tests:  ECG:  ECG 12 Lead 10/13/2023    Today.  NSR. Normal axis. Qtc 450 ms  Echo:  Transesophageal Echo (AMADOU) 10/15/2023    Normal LVEF  Stress Test:  Nuclear Stress Test 2/13/2023      Cardiac Imaging:  CTA 2023. Reviewed with patient.    Lab review: I have personally reviewed the laboratory result(s) see above    Assessment/Plan   Diagnoses and all orders for this visit:  Atrial fibrillation, unspecified type (CMS/HCC)  Paroxysmal atrial fibrillation with RVR (CMS/HCC)  Primary hypertension  Hypothyroidism, unspecified type  NASREEN (obstructive sleep apnea)  Essential hypertension  Current use of long term anticoagulation  Encounter to discuss test results  Encounter to discuss treatment options  Never smoked any substance  High risk medication use  Morbid obesity with BMI of 45.0-49.9, adult (CMS/HCC)    Marcelino Tariq RN    Imp / Plan  Persistent atrial fibrillation. Doing well after cyroablation. Reviewed ablation results. Reviewed meds. Continue meds. Add ECASA 81 mg daily x 1 month after ablation, as no bleeding  issues postoperatively after acute stabilization, femstop, and reversal of Eliquis.   Previously failed flecainide to suppress atrial fibrillation  Hgh risk med Eliquis. OK to continue. Allowing Hb to increase without transfusion.  Mild sleep apnea per patient report. Follows with PCP.   Nonsustained wide-complex tachycardia. Likely atrial fibrillation with aberrancy. Normal ejection fraction.  No history of diabetes, TIA or CVA.  PACs and intra-atrial reentry tachycardia. Stable. Reviewed meds. Continue meds. Refills.  History of appendectomy, , ovarian cystectomy and fibroadenoma.  Covid recovered.  Overweight.    AHA recommendations for exercise, diet, and behavioral modification reviewed with pt.    The patient and I discussed the mechanism of arrhythmia, ECG, device check, device check, chronotropic competence, sinus node dysfunction,, SVT, atrial fibrillation, atrial fibrillation cryoablation redsults, indications for and types of medications, discussion if and what medication refills needed, treatment options, risks, benefits, and imponderables. American Heart Association lifestyle changes and behavioral modification discussed. All questions answered in detail. Counseling over 50% visit regarding above. Patient  very appreciative of care.

## 2023-10-20 NOTE — PATIENT INSTRUCTIONS
Patient to follow up as previously scheduled with Dr. Toby MD     Ok to return to work next Friday, October 27, 2023 at previous capacity, no new restrictions.     Continue same medications.   No changes to plan today.     I, Marcelino Tariq RN am scribing for and in the presence of Dr. Ebony Luis MD

## 2023-10-20 NOTE — LETTER
October 20, 2023     Patient: Joyce Carmichael   YOB: 1975   Date of Visit: 10/20/2023       To Whom It May Concern:    Joyce Carmichael was seen in my clinic on 10/20/2023 at 10:00 am. Please excuse Joyce for her absence from work on this day to make the appointment.    Joyce may return to work on Friday October 27, 2023 at previous capacity with no new restrictions noted.     If you have any questions or concerns, please don't hesitate to call. [phone]         Sincerely,         Ebony Luis MD        CC: No Recipients

## 2023-10-25 DIAGNOSIS — I48.91 ATRIAL FIBRILLATION, UNSPECIFIED TYPE (MULTI): ICD-10-CM

## 2023-10-25 RX ORDER — DOFETILIDE 0.25 MG/1
250 CAPSULE ORAL 2 TIMES DAILY
Qty: 180 CAPSULE | Refills: 1 | Status: SHIPPED | OUTPATIENT
Start: 2023-10-25 | End: 2024-04-23 | Stop reason: SDUPTHER

## 2023-10-26 ENCOUNTER — PHARMACY VISIT (OUTPATIENT)
Dept: PHARMACY | Facility: CLINIC | Age: 48
End: 2023-10-26
Payer: COMMERCIAL

## 2023-10-26 PROCEDURE — RXMED WILLOW AMBULATORY MEDICATION CHARGE

## 2023-11-13 ENCOUNTER — PHARMACY VISIT (OUTPATIENT)
Dept: PHARMACY | Facility: CLINIC | Age: 48
End: 2023-11-13
Payer: COMMERCIAL

## 2023-11-13 DIAGNOSIS — F41.9 ANXIETY DISORDER, UNSPECIFIED TYPE: Primary | ICD-10-CM

## 2023-11-13 DIAGNOSIS — F32.0 CURRENT MILD EPISODE OF MAJOR DEPRESSIVE DISORDER WITHOUT PRIOR EPISODE (CMS-HCC): Primary | ICD-10-CM

## 2023-11-13 PROCEDURE — RXMED WILLOW AMBULATORY MEDICATION CHARGE

## 2023-11-13 RX ORDER — PAROXETINE HYDROCHLORIDE 40 MG/1
40 TABLET, FILM COATED ORAL DAILY
Qty: 90 TABLET | Refills: 3 | Status: SHIPPED | OUTPATIENT
Start: 2023-11-13 | End: 2024-11-11

## 2023-11-13 RX ORDER — PAROXETINE HYDROCHLORIDE 40 MG/1
40 TABLET, FILM COATED ORAL DAILY
Qty: 90 TABLET | Refills: 3 | Status: SHIPPED | OUTPATIENT
Start: 2023-11-13 | End: 2024-11-12

## 2023-11-13 NOTE — TELEPHONE ENCOUNTER
Needs to have paroxetine 40 mg. Send to  [facility]

## 2023-11-15 VITALS
TEMPERATURE: 36.9 F | RESPIRATION RATE: 18 BRPM | HEIGHT: 63 IN | WEIGHT: 267.42 LBS | OXYGEN SATURATION: 97 % | HEART RATE: 69 BPM | SYSTOLIC BLOOD PRESSURE: 108 MMHG | DIASTOLIC BLOOD PRESSURE: 61 MMHG | BODY MASS INDEX: 47.38 KG/M2

## 2023-11-22 ENCOUNTER — PHARMACY VISIT (OUTPATIENT)
Dept: PHARMACY | Facility: CLINIC | Age: 48
End: 2023-11-22
Payer: COMMERCIAL

## 2023-11-22 PROCEDURE — RXMED WILLOW AMBULATORY MEDICATION CHARGE

## 2023-12-13 ENCOUNTER — OFFICE VISIT (OUTPATIENT)
Dept: PRIMARY CARE | Facility: CLINIC | Age: 48
End: 2023-12-13
Payer: COMMERCIAL

## 2023-12-13 VITALS
WEIGHT: 264 LBS | OXYGEN SATURATION: 98 % | HEIGHT: 63 IN | SYSTOLIC BLOOD PRESSURE: 130 MMHG | TEMPERATURE: 97.4 F | HEART RATE: 60 BPM | DIASTOLIC BLOOD PRESSURE: 76 MMHG | BODY MASS INDEX: 46.78 KG/M2 | RESPIRATION RATE: 14 BRPM

## 2023-12-13 DIAGNOSIS — E03.9 ACQUIRED HYPOTHYROIDISM: ICD-10-CM

## 2023-12-13 DIAGNOSIS — I10 PRIMARY HYPERTENSION: Primary | ICD-10-CM

## 2023-12-13 DIAGNOSIS — H11.31 SUBCONJUNCTIVAL HEMORRHAGE OF RIGHT EYE: ICD-10-CM

## 2023-12-13 PROCEDURE — 3008F BODY MASS INDEX DOCD: CPT | Performed by: INTERNAL MEDICINE

## 2023-12-13 PROCEDURE — 99213 OFFICE O/P EST LOW 20 MIN: CPT | Performed by: INTERNAL MEDICINE

## 2023-12-13 PROCEDURE — 1036F TOBACCO NON-USER: CPT | Performed by: INTERNAL MEDICINE

## 2023-12-13 PROCEDURE — 3078F DIAST BP <80 MM HG: CPT | Performed by: INTERNAL MEDICINE

## 2023-12-13 PROCEDURE — 3075F SYST BP GE 130 - 139MM HG: CPT | Performed by: INTERNAL MEDICINE

## 2023-12-13 PROCEDURE — RXMED WILLOW AMBULATORY MEDICATION CHARGE

## 2023-12-13 RX ORDER — LEVOTHYROXINE SODIUM 25 UG/1
25 TABLET ORAL DAILY
Qty: 90 TABLET | Refills: 3 | Status: SHIPPED | OUTPATIENT
Start: 2023-12-13 | End: 2024-12-12

## 2023-12-13 ASSESSMENT — ENCOUNTER SYMPTOMS: EYE REDNESS: 1

## 2023-12-13 NOTE — PROGRESS NOTES
"Subjective   Patient ID: Joyce Carmichael is a 48 y.o. female who presents for blood shot eye (Rt  eye x 2 days , some improvement noted.).    Blood shot eye noticed after waking up.  She admits she may have been rubbing her eye. If she were not to look in the mirror she would not have noticed anything.  No eye pain.  No change in vision.      Review of Systems   Eyes:  Positive for redness.       Objective   /76 (BP Location: Right arm, Patient Position: Sitting, BP Cuff Size: Adult)   Pulse 60   Temp 36.3 °C (97.4 °F) (Tympanic)   Resp 14   Ht 1.6 m (5' 3\")   Wt 120 kg (264 lb)   LMP 12/06/2023   SpO2 98%   BMI 46.77 kg/m²     Physical Exam  HENT:      Head: Normocephalic.   Eyes:      Extraocular Movements: Extraocular movements intact.      Pupils: Pupils are equal, round, and reactive to light.      Comments: Severe subconjunctival hemorrhage of right eye.     Neurological:      Mental Status: She is alert.         Assessment/Plan   Problem List Items Addressed This Visit             ICD-10-CM    Primary hypertension - Primary I10    Acquired hypothyroidism E03.9    Relevant Medications    levothyroxine (Synthroid, Levoxyl) 25 mcg tablet    Other Relevant Orders    Thyroid Stimulating Hormone     Other Visit Diagnoses         Codes    Subconjunctival hemorrhage of right eye     H11.31        We discussed the above.    Severe subconjunctival hemorrhage of eye.  Due to severity we will have her stop her eliquis for 2 days and then she can restart.  We discussed allergy treatments (intranasal steroids and antihitamines) to decrease any rubbing or itching.    Follow prn and in 2-3 months for APE and over all reassessment.         "

## 2023-12-15 ENCOUNTER — PHARMACY VISIT (OUTPATIENT)
Dept: PHARMACY | Facility: CLINIC | Age: 48
End: 2023-12-15
Payer: COMMERCIAL

## 2023-12-15 PROCEDURE — RXMED WILLOW AMBULATORY MEDICATION CHARGE

## 2024-01-05 PROCEDURE — RXMED WILLOW AMBULATORY MEDICATION CHARGE

## 2024-01-09 ENCOUNTER — OFFICE VISIT (OUTPATIENT)
Dept: CARDIOLOGY | Facility: CLINIC | Age: 49
End: 2024-01-09
Payer: COMMERCIAL

## 2024-01-09 ENCOUNTER — PHARMACY VISIT (OUTPATIENT)
Dept: PHARMACY | Facility: CLINIC | Age: 49
End: 2024-01-09
Payer: COMMERCIAL

## 2024-01-09 VITALS
HEIGHT: 63 IN | HEART RATE: 81 BPM | WEIGHT: 264 LBS | BODY MASS INDEX: 46.78 KG/M2 | DIASTOLIC BLOOD PRESSURE: 86 MMHG | SYSTOLIC BLOOD PRESSURE: 138 MMHG

## 2024-01-09 DIAGNOSIS — Z71.89 ENCOUNTER TO DISCUSS TREATMENT OPTIONS: ICD-10-CM

## 2024-01-09 DIAGNOSIS — G47.33 OSA (OBSTRUCTIVE SLEEP APNEA): ICD-10-CM

## 2024-01-09 DIAGNOSIS — E03.9 ACQUIRED HYPOTHYROIDISM: ICD-10-CM

## 2024-01-09 DIAGNOSIS — E66.01 OBESITY, MORBID, BMI 40.0-49.9 (MULTI): ICD-10-CM

## 2024-01-09 DIAGNOSIS — Z79.899 HIGH RISK MEDICATION USE: ICD-10-CM

## 2024-01-09 DIAGNOSIS — Z79.01 CURRENT USE OF LONG TERM ANTICOAGULATION: ICD-10-CM

## 2024-01-09 DIAGNOSIS — Z78.9 NEVER SMOKED ANY SUBSTANCE: ICD-10-CM

## 2024-01-09 DIAGNOSIS — I48.0 PAROXYSMAL ATRIAL FIBRILLATION WITH RVR (MULTI): Primary | ICD-10-CM

## 2024-01-09 DIAGNOSIS — I10 PRIMARY HYPERTENSION: ICD-10-CM

## 2024-01-09 DIAGNOSIS — Z71.2 ENCOUNTER TO DISCUSS TEST RESULTS: ICD-10-CM

## 2024-01-09 DIAGNOSIS — Z71.89 ENCOUNTER FOR MEDICATION REVIEW AND COUNSELING: ICD-10-CM

## 2024-01-09 PROCEDURE — 1036F TOBACCO NON-USER: CPT | Performed by: INTERNAL MEDICINE

## 2024-01-09 PROCEDURE — 3075F SYST BP GE 130 - 139MM HG: CPT | Performed by: INTERNAL MEDICINE

## 2024-01-09 PROCEDURE — 3079F DIAST BP 80-89 MM HG: CPT | Performed by: INTERNAL MEDICINE

## 2024-01-09 PROCEDURE — 93000 ELECTROCARDIOGRAM COMPLETE: CPT | Performed by: INTERNAL MEDICINE

## 2024-01-09 PROCEDURE — 99214 OFFICE O/P EST MOD 30 MIN: CPT | Performed by: INTERNAL MEDICINE

## 2024-01-09 PROCEDURE — 3008F BODY MASS INDEX DOCD: CPT | Performed by: INTERNAL MEDICINE

## 2024-01-09 ASSESSMENT — ENCOUNTER SYMPTOMS: DYSPNEA ON EXERTION: 0

## 2024-01-09 NOTE — PROGRESS NOTES
"Chief Complaint:   Follow-up (Patient presented today for a 4 month follow up./EKG done in office today.//)     History Of Present Illness:    Joyce Carmichael is a 48 y.o. female presenting with follow-up.  She is doing well after ablation.  She has no significant arrhythmia symptoms.  Rarely she may have a flutter that lasts \"5 seconds.\"  She is pleased with the results of her ablation    She is working weekends and picking up extra shifts.  She has no cardiac symptoms that limit her activity.    Last Recorded Vitals:  Vitals:    01/09/24 1052   BP: 138/86   BP Location: Left arm   Patient Position: Sitting   BP Cuff Size: Adult   Pulse: 81   Weight: 120 kg (264 lb)   Height: 1.6 m (5' 3\")       Past Medical History:  See List    Past Surgical History:  See List       Social History:  She reports that she has never smoked. She has never used smokeless tobacco. She reports that she does not drink alcohol and does not use drugs.    Family History:  Family History   Problem Relation Name Age of Onset    Other (cva) Mother      Hypertension Mother      Diabetes type II Mother      Other (Cardiac Disease) Father      Coronary artery disease Father      Hypertension Father      Obesity Father      Diabetes type II Father      No Known Problems Sister      Other (cardiomegaly) Brother      Hypothyroidism Brother          Allergies:  Iodinated contrast media and Ciprofloxacin    Outpatient Medications:  Current Outpatient Medications   Medication Instructions    albuterol 108 (90 Base) MCG/ACT inhaler 2 puffs, inhalation, Every 4 hours PRN    apixaban (ELIQUIS) 5 mg, oral, 2 times daily, Hold today and resume in am on 10/14/23    dilTIAZem (CARDIZEM) 30 mg, oral, 3 times daily    dofetilide (TIKOSYN) 250 mcg, oral, 2 times daily    ferrous sulfate 325 (65 Fe) MG tablet 65 mg of iron, oral, Daily with breakfast    fexofenadine (Allegra Allergy) 180 mg tablet 1 tablet, oral, Daily    fluticasone propion-salmeteroL (Advair " Diskus) 250-50 mcg/dose diskus inhaler 1 puff, inhalation, 2 times daily    levothyroxine (SYNTHROID, LEVOXYL) 25 mcg, oral, Daily    losartan (COZAAR) 25 mg, oral, As needed    magnesium oxide (Mag-Ox) 400 mg (241.3 mg magnesium) tablet TAKE 2 TABLETS BY MOUTH DAILY    metoprolol tartrate (Lopressor) 50 mg tablet TAKE 1 TABLET BY MOUTH TWO TIMES A DAY    PARoxetine (PAXIL) 40 mg, oral, Daily    PARoxetine (PAXIL) 40 mg, oral, Daily   Review of Systems   Cardiovascular:  Negative for chest pain and dyspnea on exertion.   All other systems reviewed and are negative.    Physical Exam:  Constitutional:       General: Awake.      Appearance: Normal and healthy appearance. Well-developed and not in distress.   Neck:      Vascular: No JVR. JVD normal.   Pulmonary:      Effort: Pulmonary effort is normal.      Breath sounds: Normal breath sounds. No wheezing. No rhonchi. No rales.   Chest:      Chest wall: Not tender to palpatation.   Cardiovascular:      PMI at left midclavicular line. Normal rate. Regular rhythm. Normal S1. Normal S2.       Murmurs: There is no murmur.      No gallop.  No click. No rub.   Pulses:     Intact distal pulses.   Edema:     Peripheral edema absent.   Abdominal:      Tenderness: There is no abdominal tenderness.   Musculoskeletal: Normal range of motion.         General: No tenderness. Skin:     General: Skin is warm and dry.   Neurological:      General: No focal deficit present.      Mental Status: Alert and oriented to person, place and time.            Last Labs:  CBC -  Lab Results   Component Value Date    WBC 9.2 10/13/2023    HGB 7.6 (L) 10/13/2023    HCT 26.3 (L) 10/13/2023    MCV 85 10/13/2023     10/13/2023       CMP -  Lab Results   Component Value Date    CALCIUM 8.5 (L) 10/13/2023    PROT 8.2 10/11/2023    ALBUMIN 4.1 10/11/2023    AST 18 10/11/2023    ALT 15 10/11/2023    ALKPHOS 81 10/11/2023    BILITOT 0.6 10/11/2023       LIPID PANEL -   Lab Results   Component Value  Date    CHOL 154 10/12/2023    TRIG 129 10/12/2023    HDL 40.4 10/12/2023    CHHDL 3.8 10/12/2023    LDLF 99 06/02/2022    VLDL 26 10/12/2023    NHDL 114 10/12/2023       RENAL FUNCTION PANEL -   Lab Results   Component Value Date    GLUCOSE 136 (H) 10/13/2023     (L) 10/13/2023    K 3.9 10/13/2023     10/13/2023    CO2 25 10/13/2023    ANIONGAP 11 10/13/2023    BUN 16 10/13/2023    CREATININE 0.65 10/13/2023    CALCIUM 8.5 (L) 10/13/2023    ALBUMIN 4.1 10/11/2023        Lab Results   Component Value Date     (H) 10/12/2022    HGBA1C 5.5 06/02/2022       Last Cardiology Tests:  ECG:  Today.  Normal sinus rhythm.  Normal axis.  Corrected QT interval 460 ms  ECG 12 lead (Clinic Performed) 10/20/2023      Echo:  Transesophageal Echo (AMADOU) 10/11/2023      Stress Test:  Nuclear Stress Test 07/08/2021      Cardiac Imaging:  CTA 2023. Reviewed with patient.    Lab review: I have personally reviewed the laboratory result(s) see above    Assessment/Plan   Diagnoses and all orders for this visit:  Paroxysmal atrial fibrillation with RVR (CMS/[facility])  -     ECG 12 lead (Clinic Performed)  -     ECG 12 Lead  Primary hypertension  Acquired hypothyroidism  NASREEN (obstructive sleep apnea)  Current use of long term anticoagulation  High risk medication use  Never smoked any substance  Obesity, morbid, BMI 40.0-49.9 (CMS/[facility])  Encounter for medication review and counseling  Encounter to discuss treatment options  Encounter to discuss test results  Monica Britt RN    Persistent atrial fibrillation. Doing well after cyroablation. Reviewed ablation results. Reviewed meds. Continue meds.  Discussed refills.  Previously failed flecainide to suppress atrial fibrillation  Hgh risk med Eliquis.  Continue.  Discussed refill  High risk med dofetilide.  Continue.  Refill discussed.  Mild sleep apnea per patient report. Follows with PCP.   Nonsustained wide-complex tachycardia. Likely atrial fibrillation with aberrancy. Normal  ejection fraction.  No further evaluation indicated.  No history of diabetes, TIA or CVA.  PACs and intra-atrial reentry tachycardia. Stable. Reviewed meds. Continue meds. Refills discussed  History of appendectomy, , ovarian cystectomy and fibroadenoma.  Covid recovered.  Overweight.     AHA recommendations for exercise, diet, and behavioral modification reviewed with pt.     The patient and I discussed the mechanism of arrhythmia, ECG, atrial fibrillation, atrial fibrillation cryoablation redsults, palpitation, indications for and types of medications, discussion if and what medication refills needed, treatment options, risks, benefits, and imponderables. American Heart Association lifestyle changes and behavioral modification discussed. All questions answered in detail. Counseling over 50% visit regarding above. Patient  very appreciative of care.

## 2024-01-09 NOTE — PATIENT INSTRUCTIONS
Continue same medications/treatment.  Patient educated on proper medication use.  Patient educated on risk factor modification.  Please bring any lab results from other providers/physicians to your next appointment.    Please bring all medicines, vitamins, and herbal supplements with you when you come to the office.    Prescriptions will not be filled unless you are compliant with your follow up appointments or have a follow up appointment scheduled as per instruction of your physician. Refills should be requested at the time of your visit.    Follow up with Debbie in 6 months  Follow up with Dr. Luis in 12 months     ALLEGRA LESLIE RN, AM SCRIBING FOR AND IN THE PRESENCE OF DR. ROMMEL LUIS MD, FACC, FACP, FHPS

## 2024-01-15 ENCOUNTER — PATIENT MESSAGE (OUTPATIENT)
Dept: CARDIOLOGY | Facility: CLINIC | Age: 49
End: 2024-01-15
Payer: COMMERCIAL

## 2024-01-16 PROCEDURE — RXMED WILLOW AMBULATORY MEDICATION CHARGE

## 2024-01-16 NOTE — TELEPHONE ENCOUNTER
From: Joyce Howe  To: Ebony Luis MD  Sent: 1/15/2024 9:59 PM EST  Subject: Abixaban    Hello.   May you please refill the eliquis 5 mg script. It hasn’t carried over to the new system when I tried to refill it.   Thank you:)  Joyce howe

## 2024-01-17 ENCOUNTER — TELEPHONE (OUTPATIENT)
Dept: CARDIOLOGY | Facility: CLINIC | Age: 49
End: 2024-01-17
Payer: COMMERCIAL

## 2024-01-17 DIAGNOSIS — I48.11 LONGSTANDING PERSISTENT ATRIAL FIBRILLATION (MULTI): ICD-10-CM

## 2024-01-17 PROCEDURE — RXMED WILLOW AMBULATORY MEDICATION CHARGE

## 2024-01-18 ENCOUNTER — OFFICE VISIT (OUTPATIENT)
Dept: OPHTHALMOLOGY | Facility: CLINIC | Age: 49
End: 2024-01-18
Payer: COMMERCIAL

## 2024-01-18 DIAGNOSIS — G43.109 OPHTHALMIC MIGRAINE: ICD-10-CM

## 2024-01-18 DIAGNOSIS — H01.02A SQUAMOUS BLEPHARITIS OF UPPER AND LOWER EYELIDS OF BOTH EYES: Primary | ICD-10-CM

## 2024-01-18 DIAGNOSIS — H01.02B SQUAMOUS BLEPHARITIS OF UPPER AND LOWER EYELIDS OF BOTH EYES: Primary | ICD-10-CM

## 2024-01-18 DIAGNOSIS — Z86.69 HISTORY OF SUBCONJUNCTIVAL HEMORRHAGE: ICD-10-CM

## 2024-01-18 PROCEDURE — 92002 INTRM OPH EXAM NEW PATIENT: CPT | Performed by: OPHTHALMOLOGY

## 2024-01-18 ASSESSMENT — REFRACTION_MANIFEST
OS_ADD: +1.25
OD_CYLINDER: -0.75
METHOD_AUTOREFRACTION: 1
OS_AXIS: 070
OD_SPHERE: -0.50
OS_SPHERE: -0.50
OD_CYLINDER: -0.25
OD_AXIS: 080
OS_SPHERE: -0.50
OS_AXIS: 070
OS_CYLINDER: -0.50
OD_ADD: +1.25
OD_SPHERE: -0.50
OS_CYLINDER: -0.75
OD_AXIS: 080

## 2024-01-18 ASSESSMENT — VISUAL ACUITY
OD_SC: 20/30
OD_PH_SC: 20/25
OS_SC: 20/25
OS_PH_SC: 20/20
METHOD: SNELLEN - LINEAR

## 2024-01-18 ASSESSMENT — TONOMETRY
OD_IOP_MMHG: 17
OS_IOP_MMHG: 15
IOP_METHOD: GOLDMANN APPLANATION

## 2024-01-18 ASSESSMENT — EXTERNAL EXAM - LEFT EYE: OS_EXAM: NORMAL

## 2024-01-18 ASSESSMENT — SLIT LAMP EXAM - LIDS
COMMENTS: MILD CRUSTING
COMMENTS: MILD CRUSTING

## 2024-01-18 ASSESSMENT — EXTERNAL EXAM - RIGHT EYE: OD_EXAM: NORMAL

## 2024-01-18 NOTE — PROGRESS NOTES
Assessment/Plan   Diagnoses and all orders for this visit:  Squamous blepharitis of upper and lower eyelids of both eyes  -start warm compresses both eyes bid for 30 minutes    History of subconjunctival hemorrhage  -discussed good blood pressure control    Ophthalmic migraine  continue to monitor    Return for a dilated exam in    2 months or sooner if having any problems

## 2024-01-20 ENCOUNTER — PHARMACY VISIT (OUTPATIENT)
Dept: PHARMACY | Facility: CLINIC | Age: 49
End: 2024-01-20
Payer: COMMERCIAL

## 2024-01-20 PROCEDURE — RXMED WILLOW AMBULATORY MEDICATION CHARGE

## 2024-03-19 ENCOUNTER — APPOINTMENT (OUTPATIENT)
Dept: OPHTHALMOLOGY | Facility: CLINIC | Age: 49
End: 2024-03-19
Payer: COMMERCIAL

## 2024-03-21 ENCOUNTER — PHARMACY VISIT (OUTPATIENT)
Dept: PHARMACY | Facility: CLINIC | Age: 49
End: 2024-03-21
Payer: COMMERCIAL

## 2024-03-21 PROCEDURE — RXMED WILLOW AMBULATORY MEDICATION CHARGE

## 2024-03-21 RX ORDER — PENICILLIN V POTASSIUM 500 MG/1
500 TABLET, FILM COATED ORAL EVERY 6 HOURS
Qty: 32 TABLET | Refills: 0 | OUTPATIENT
Start: 2024-03-21

## 2024-04-11 ENCOUNTER — APPOINTMENT (OUTPATIENT)
Dept: OPHTHALMOLOGY | Facility: CLINIC | Age: 49
End: 2024-04-11
Payer: COMMERCIAL

## 2024-04-23 DIAGNOSIS — I48.91 ATRIAL FIBRILLATION, UNSPECIFIED TYPE (MULTI): ICD-10-CM

## 2024-04-24 PROCEDURE — RXMED WILLOW AMBULATORY MEDICATION CHARGE

## 2024-04-24 RX ORDER — DOFETILIDE 0.25 MG/1
250 CAPSULE ORAL 2 TIMES DAILY
Qty: 180 CAPSULE | Refills: 3 | Status: SHIPPED | OUTPATIENT
Start: 2024-04-24 | End: 2025-04-24

## 2024-04-24 NOTE — TELEPHONE ENCOUNTER
Received request for prescription refills for patient.   Patient follows with Dr. Luis/Debbie Neal NP      Last OV 1/9/24  Next OV 7/9/24    Pended for signing and sent to provider

## 2024-04-25 ENCOUNTER — PHARMACY VISIT (OUTPATIENT)
Dept: PHARMACY | Facility: CLINIC | Age: 49
End: 2024-04-25
Payer: COMMERCIAL

## 2024-05-22 ENCOUNTER — PHARMACY VISIT (OUTPATIENT)
Dept: PHARMACY | Facility: CLINIC | Age: 49
End: 2024-05-22
Payer: COMMERCIAL

## 2024-05-22 PROCEDURE — RXMED WILLOW AMBULATORY MEDICATION CHARGE

## 2024-05-29 ENCOUNTER — PHARMACY VISIT (OUTPATIENT)
Dept: PHARMACY | Facility: CLINIC | Age: 49
End: 2024-05-29

## 2024-05-29 PROCEDURE — RXOTC WILLOW AMBULATORY OTC CHARGE

## 2024-06-13 DIAGNOSIS — I10 PRIMARY HYPERTENSION: ICD-10-CM

## 2024-06-14 PROCEDURE — RXMED WILLOW AMBULATORY MEDICATION CHARGE

## 2024-06-14 RX ORDER — METOPROLOL TARTRATE 50 MG/1
50 TABLET ORAL 2 TIMES DAILY
Qty: 180 TABLET | Refills: 3 | Status: SHIPPED | OUTPATIENT
Start: 2024-06-14 | End: 2025-06-14

## 2024-06-28 ENCOUNTER — PHARMACY VISIT (OUTPATIENT)
Dept: PHARMACY | Facility: CLINIC | Age: 49
End: 2024-06-28
Payer: COMMERCIAL

## 2024-07-09 ENCOUNTER — APPOINTMENT (OUTPATIENT)
Dept: CARDIOLOGY | Facility: CLINIC | Age: 49
End: 2024-07-09
Payer: COMMERCIAL

## 2024-07-18 DIAGNOSIS — I48.11 LONGSTANDING PERSISTENT ATRIAL FIBRILLATION (MULTI): ICD-10-CM

## 2024-07-18 PROCEDURE — RXMED WILLOW AMBULATORY MEDICATION CHARGE

## 2024-07-23 PROCEDURE — RXMED WILLOW AMBULATORY MEDICATION CHARGE

## 2024-07-23 RX ORDER — LANOLIN ALCOHOL/MO/W.PET/CERES
2 CREAM (GRAM) TOPICAL DAILY
Qty: 180 TABLET | Refills: 3 | Status: SHIPPED | OUTPATIENT
Start: 2024-07-23 | End: 2025-07-23

## 2024-07-23 NOTE — TELEPHONE ENCOUNTER
Received request for prescription refills for patient.   Patient follows with Dr. Luis     Request is for Mag Ox 400mg 2 tabs daily  Is patient currently on medication yes    Last OV 1/9/24  Next OV [facility] 7/9/24-sec'y made aware OV needed    Pended for signing and sent to provider

## 2024-07-29 ENCOUNTER — APPOINTMENT (OUTPATIENT)
Dept: CARDIOLOGY | Facility: CLINIC | Age: 49
End: 2024-07-29
Payer: COMMERCIAL

## 2024-07-29 ENCOUNTER — PHARMACY VISIT (OUTPATIENT)
Dept: PHARMACY | Facility: CLINIC | Age: 49
End: 2024-07-29
Payer: COMMERCIAL

## 2024-07-29 VITALS
SYSTOLIC BLOOD PRESSURE: 128 MMHG | HEIGHT: 63 IN | WEIGHT: 273 LBS | BODY MASS INDEX: 48.37 KG/M2 | DIASTOLIC BLOOD PRESSURE: 70 MMHG | HEART RATE: 78 BPM

## 2024-07-29 DIAGNOSIS — Z79.01 CURRENT USE OF LONG TERM ANTICOAGULATION: ICD-10-CM

## 2024-07-29 DIAGNOSIS — E66.01 MORBID OBESITY WITH BMI OF 45.0-49.9, ADULT (MULTI): ICD-10-CM

## 2024-07-29 DIAGNOSIS — I48.11 LONGSTANDING PERSISTENT ATRIAL FIBRILLATION (MULTI): Primary | ICD-10-CM

## 2024-07-29 PROCEDURE — 99213 OFFICE O/P EST LOW 20 MIN: CPT | Performed by: NURSE PRACTITIONER

## 2024-07-29 PROCEDURE — 3008F BODY MASS INDEX DOCD: CPT | Performed by: NURSE PRACTITIONER

## 2024-07-29 PROCEDURE — 3078F DIAST BP <80 MM HG: CPT | Performed by: NURSE PRACTITIONER

## 2024-07-29 PROCEDURE — 3074F SYST BP LT 130 MM HG: CPT | Performed by: NURSE PRACTITIONER

## 2024-07-29 PROCEDURE — 93000 ELECTROCARDIOGRAM COMPLETE: CPT | Performed by: INTERNAL MEDICINE

## 2024-07-29 PROCEDURE — 1036F TOBACCO NON-USER: CPT | Performed by: NURSE PRACTITIONER

## 2024-07-29 NOTE — PROGRESS NOTES
"CARDIOLOGY OFFICE VISIT      CHIEF COMPLAINT  Chief Complaint   Patient presents with    Follow-up     Pt is here today following up for an overdue appt and refills    Chief complaint: \"I feel fluttering from time to time that lasts up to 10 seconds.\"    HISTORY OF PRESENT ILLNESS  HPI  History: The patient is a 49-year-old  female who is followed for paroxysmal atrial fibrillation.  She failed a trial of flecainide.  She underwent cryoablation on October 12, 2024 and remains on dofetilide, magnesium oxide, and metoprolol and is anticoagulated with Eliquis for prophylaxis from thromboembolic event.  She presents to the office today for follow-up evaluation.  She states that she does occasionally experience flutters in her chest that can last up to 10 seconds in duration.  She states they occur at any time.  She believes the reason why she continues to be symptomatic it is due to the weight gain she is experiencing.  She states that she has been trying to lose weight but her weight continues to climb.  She states that her primary care physician did discuss initiation of Ozempic.  She is thinking about trying this medication for weight reduction.  She denies chest pain, shortness of breath, abdominal distention, orthopnea.  She does experience lower extremity edema especially when she has been on her feet after working.  The swelling does improve with elevating the extremities.  Past Medical History  Past Medical History:   Diagnosis Date    Arrhythmia     Hyperlipidemia     Hypertension     Sleep apnea        Social History  Social History     Tobacco Use    Smoking status: Never    Smokeless tobacco: Never   Vaping Use    Vaping status: Never Used   Substance Use Topics    Alcohol use: Never    Drug use: Never       Family History     Family History   Problem Relation Name Age of Onset    Other (cva) Mother      Hypertension Mother      Diabetes type II Mother      Other (Cardiac Disease) Father      " Coronary artery disease Father      Hypertension Father      Obesity Father      Diabetes type II Father      No Known Problems Sister      Other (cardiomegaly) Brother      Hypothyroidism Brother          Allergies:  Allergies   Allergen Reactions    Iodinated Contrast Media Hives    Ciprofloxacin Other        Outpatient Medications:  Current Outpatient Medications   Medication Instructions    albuterol 108 (90 Base) MCG/ACT inhaler 2 puffs, inhalation, Every 4 hours PRN    apixaban (Eliquis) 5 mg tablet Take 1 tablet (5 mg) by mouth 2 times a day    dilTIAZem (CARDIZEM) 30 mg, oral, 3 times daily    dofetilide (TIKOSYN) 250 mcg, oral, 2 times daily    ferrous sulfate 325 (65 Fe) MG tablet 65 mg of iron, oral, Daily with breakfast    fexofenadine (Allegra Allergy) 180 mg tablet 1 tablet, oral, Daily    fluticasone propion-salmeteroL (Advair Diskus) 250-50 mcg/dose diskus inhaler 1 puff, inhalation, 2 times daily    levothyroxine (SYNTHROID, LEVOXYL) 25 mcg, oral, Daily    losartan (COZAAR) 25 mg, oral, As needed    magnesium oxide (MAG-OX) 800 mg, oral, Daily    metoprolol tartrate (LOPRESSOR) 50 mg, oral, 2 times daily    PARoxetine (PAXIL) 40 mg, oral, Daily    PARoxetine (PAXIL) 40 mg, oral, Daily    penicillin v potassium (Veetid) 500 mg tablet Take 1 tablet (500 mg) by mouth every 6 hours until all gone.          REVIEW OF SYSTEMS  Review of Systems   All other systems reviewed and are negative.        VITALS  Vitals:    07/29/24 1110   BP: 128/70   Pulse: 78       PHYSICAL EXAM  Vitals and nursing note reviewed.   Constitutional:       Appearance: Normal appearance.   HENT:      Head: Normocephalic.   Neck:      Vascular: No JVD. Carotid upstrokes II/IV.  Cardiovascular:      Rate and Rhythm: Normal rate and regular rhythm.      Pulses: Normal pulses.      Heart sounds: Normal S1 S2, no S3 S4.  No murmurs or rubs.  Pulmonary:      Effort: Pulmonary effort is normal. Respirations regular and nonlabored.      Breath sounds: Clear to auscultation posterior laterally.  Abdominal:      General: Bowel sounds are normal.      Palpations: Abdomen is soft.   Musculoskeletal:         General: Normal range of motion.      Cervical back: Normal range of motion.   Skin:     General: Skin is warm and dry.   Neurological:      General: No focal deficit present.      Mental Status: Alert and oriented to person, place, and time.      Motor: Motor function is intact.   Psychiatric:         Attention and Perception: Attention and perception normal.         Mood and Affect: Mood and affect normal.         Speech: Speech normal.         Behavior: Behavior normal. Behavior is cooperative.         Thought Content: Thought content normal.         Cognition and Memory: Cognition and memory normal.     Labs and testing: Twelve-lead EKG reveals sinus rhythm without ectopics no acute ischemic changes.  QRS duration 76 ms,  ms, QTc 467 ms.  AMADOU dated October 11, 2023 revealed an ejection fraction of 55 to 60%, moderate left atrial enlargement, grade 2 plaque in the descending aorta.      ASSESSMENT AND PLAN    Clinical impressions:  1. Paroxysmal atrial fibrillation with failure of flecainide to control the arrhythmia, status post cryoablation on October 12, 2023 and presently maintained on a combination of dofetilide, metoprolol, magnesium oxide, and anticoagulated with Eliquis.  2. Hypothyroidism on replacement therapy.  3. Hypertension, controlled with a blood pressure today 128/70.  4. Anxiety disorder.  5. Remote COVID-19. Recovered.  6. Mild obstructive sleep apnea, not requiring CPAP.  7. Morbid obesity with a BMI of 48.36.       Recommendations:  1.  Continue current medications as prescribed.  2.  Continue lifestyle modifications as discussed.  3.  Follow-up in office with Dr. Luis in 6 months or sooner if needed.  4.  I discussed with the patient I am unaware of any contraindications between the use of Ozempic and  dofetilide.    Evaluation and note by Debbie Neal, CNP  **Please excuse any errors in grammar or translation related to this dictation.  Voice recognition software was utilized to prepare this document.**

## 2024-07-31 DIAGNOSIS — Z00.00 PERIODIC HEALTH ASSESSMENT, GENERAL SCREENING, ADULT: Primary | ICD-10-CM

## 2024-08-07 ENCOUNTER — TELEPHONE (OUTPATIENT)
Dept: GASTROENTEROLOGY | Facility: CLINIC | Age: 49
End: 2024-08-07
Payer: COMMERCIAL

## 2024-08-07 NOTE — TELEPHONE ENCOUNTER
Please call  to schedule colonoscopy at a hospital as you do not meet the criteria for [place].  Thank you.

## 2024-08-29 PROCEDURE — RXMED WILLOW AMBULATORY MEDICATION CHARGE

## 2024-08-30 PROCEDURE — RXMED WILLOW AMBULATORY MEDICATION CHARGE

## 2024-08-31 ENCOUNTER — PHARMACY VISIT (OUTPATIENT)
Dept: PHARMACY | Facility: CLINIC | Age: 49
End: 2024-08-31
Payer: COMMERCIAL

## 2024-10-22 ENCOUNTER — APPOINTMENT (OUTPATIENT)
Dept: OBSTETRICS AND GYNECOLOGY | Facility: CLINIC | Age: 49
End: 2024-10-22
Payer: COMMERCIAL

## 2024-10-23 ENCOUNTER — APPOINTMENT (OUTPATIENT)
Dept: OBSTETRICS AND GYNECOLOGY | Facility: CLINIC | Age: 49
End: 2024-10-23
Payer: COMMERCIAL

## 2024-10-23 VITALS
HEIGHT: 60 IN | HEART RATE: 73 BPM | OXYGEN SATURATION: 97 % | BODY MASS INDEX: 53.09 KG/M2 | WEIGHT: 270.4 LBS | SYSTOLIC BLOOD PRESSURE: 108 MMHG | DIASTOLIC BLOOD PRESSURE: 74 MMHG

## 2024-10-23 DIAGNOSIS — Z12.31 VISIT FOR SCREENING MAMMOGRAM: ICD-10-CM

## 2024-10-23 DIAGNOSIS — Z01.419 WELL WOMAN EXAM WITH ROUTINE GYNECOLOGICAL EXAM: Primary | ICD-10-CM

## 2024-10-23 PROCEDURE — 99386 PREV VISIT NEW AGE 40-64: CPT | Performed by: OBSTETRICS & GYNECOLOGY

## 2024-10-23 PROCEDURE — 3074F SYST BP LT 130 MM HG: CPT | Performed by: OBSTETRICS & GYNECOLOGY

## 2024-10-23 PROCEDURE — 3008F BODY MASS INDEX DOCD: CPT | Performed by: OBSTETRICS & GYNECOLOGY

## 2024-10-23 PROCEDURE — 1036F TOBACCO NON-USER: CPT | Performed by: OBSTETRICS & GYNECOLOGY

## 2024-10-23 PROCEDURE — 3078F DIAST BP <80 MM HG: CPT | Performed by: OBSTETRICS & GYNECOLOGY

## 2024-10-23 PROCEDURE — 87624 HPV HI-RISK TYP POOLED RSLT: CPT

## 2024-10-23 SDOH — ECONOMIC STABILITY: FOOD INSECURITY: WITHIN THE PAST 12 MONTHS, THE FOOD YOU BOUGHT JUST DIDN'T LAST AND YOU DIDN'T HAVE MONEY TO GET MORE.: NEVER TRUE

## 2024-10-23 SDOH — ECONOMIC STABILITY: TRANSPORTATION INSECURITY
IN THE PAST 12 MONTHS, HAS LACK OF TRANSPORTATION KEPT YOU FROM MEETINGS, WORK, OR FROM GETTING THINGS NEEDED FOR DAILY LIVING?: NO

## 2024-10-23 SDOH — HEALTH STABILITY: PHYSICAL HEALTH: ON AVERAGE, HOW MANY DAYS PER WEEK DO YOU ENGAGE IN MODERATE TO STRENUOUS EXERCISE (LIKE A BRISK WALK)?: 0 DAYS

## 2024-10-23 SDOH — ECONOMIC STABILITY: FOOD INSECURITY: WITHIN THE PAST 12 MONTHS, YOU WORRIED THAT YOUR FOOD WOULD RUN OUT BEFORE YOU GOT MONEY TO BUY MORE.: NEVER TRUE

## 2024-10-23 SDOH — ECONOMIC STABILITY: TRANSPORTATION INSECURITY
IN THE PAST 12 MONTHS, HAS THE LACK OF TRANSPORTATION KEPT YOU FROM MEDICAL APPOINTMENTS OR FROM GETTING MEDICATIONS?: NO

## 2024-10-23 SDOH — HEALTH STABILITY: PHYSICAL HEALTH: ON AVERAGE, HOW MANY MINUTES DO YOU ENGAGE IN EXERCISE AT THIS LEVEL?: 0 MIN

## 2024-10-23 ASSESSMENT — LIFESTYLE VARIABLES
AUDIT-C TOTAL SCORE: 0
HOW OFTEN DO YOU HAVE A DRINK CONTAINING ALCOHOL: NEVER
HOW OFTEN DO YOU HAVE SIX OR MORE DRINKS ON ONE OCCASION: NEVER
SKIP TO QUESTIONS 9-10: 1
HOW OFTEN DO YOU HAVE SIX OR MORE DRINKS ON ONE OCCASION: NEVER
HOW MANY STANDARD DRINKS CONTAINING ALCOHOL DO YOU HAVE ON A TYPICAL DAY: PATIENT DOES NOT DRINK
SKIP TO QUESTIONS 9-10: 1
HOW MANY STANDARD DRINKS CONTAINING ALCOHOL DO YOU HAVE ON A TYPICAL DAY: 1 OR 2
HOW OFTEN DO YOU HAVE A DRINK CONTAINING ALCOHOL: MONTHLY OR LESS
AUDIT-C TOTAL SCORE: 1

## 2024-10-23 ASSESSMENT — PAIN SCALES - GENERAL: PAINLEVEL_OUTOF10: 0-NO PAIN

## 2024-10-23 ASSESSMENT — PATIENT HEALTH QUESTIONNAIRE - PHQ9
1. LITTLE INTEREST OR PLEASURE IN DOING THINGS: NOT AT ALL
2. FEELING DOWN, DEPRESSED OR HOPELESS: NOT AT ALL
SUM OF ALL RESPONSES TO PHQ9 QUESTIONS 1 & 2: 0

## 2024-10-23 ASSESSMENT — SOCIAL DETERMINANTS OF HEALTH (SDOH): HOW HARD IS IT FOR YOU TO PAY FOR THE VERY BASICS LIKE FOOD, HOUSING, MEDICAL CARE, AND HEATING?: NOT HARD AT ALL

## 2024-10-23 NOTE — PROGRESS NOTES
Annual Exam    Pap: 1/15/2019 nml HPV-  Елена: 10/4/2021 Cat 2  LMP: 10/15/2024  CC: Discuss heavy cycles, perimenopause sx's?    Domenica Aguayo MA II       GYN ANNUAL EXAM    SUBJECTIVE    Joyce Carmichael is a 49 y.o. female who presents for annual exam today.  Her periods are regular.  They are heavy on the first 2 days.  This is not new for her, but she has noticed that the heaviness has increased over the past 3 years since she has been taking Eliquis for a-fib (started in ).  She is now s/p ablation, so may be able to discontinue the Eliquis sometime soon, but there is no definitive plan for this yet.       PMH - arthritis, HTN, atrial fibrillation, asthma     PSH - c/s x 1, appendectomy     OB history -   OB History    Para Term  AB Living   4 3           SAB IAB Ectopic Multiple Live Births                  # Outcome Date GA Lbr Ru/2nd Weight Sex Type Anes PTL Lv   4             3 Para            2 Para            1 Para              Last pap -   Normal HPV Negative-      Last mammogram -  - BIRADs 2     Family history of breast or ovarian cancer - none     OBJECTIVE  /74 (BP Location: Right arm, Patient Position: Sitting)   Pulse 73   Ht 1.524 m (5')   Wt 123 kg (270 lb 6.4 oz)   LMP 10/15/2024 (Exact Date)   SpO2 97%   BMI 52.81 kg/m²     General Appearance   - consistent with stated age, well groomed and cooperative    Integumentary  - skin warm and dry without rash    Head and Neck  - normalocephalic and neck supple    Chest and Lung Exam  - normal breathing effort, no respiratory distress    Breast  - symmetry noted, no mass palpable, no skin change and no nipple discharge.    Abdomen  - soft, nontender and no hepatomegaly, splenomegaly, or mass, obese     Female Genitourinary  - vulva normal without rash or lesion, normal vaginal rugae, no vaginal discharge, uterus normal size & no palpable masses, no adnexal mass, no adnexal tenderness, no cervical motion  tenderness, bimanual exam limited by patient's body habitus     Peripheral Vascular  - no edema present    ASSESSMENT/PLAN  49 y.o.  female who presents for annual exam.       Actions performed during this visit include:  - Clinical breast exam  - Clinical pelvic exam  - Pap- Screening pap/HPV done today   - Mammogram- Screening mammogram ordered   - Colon cancer screening- Has order for colonoscopy -needs to schedule   - Heavy periods - not a new problem but seem a little worse since starting Eliquis. Discussed management options.  May be interested in Mirena IUD.  Wants to think about it for now.     Please return for your next visit in 1 year or sooner as needed.     Louise Hawley MD

## 2024-11-05 LAB
CYTOLOGY CMNT CVX/VAG CYTO-IMP: NORMAL
HPV HR 12 DNA GENITAL QL NAA+PROBE: NEGATIVE
HPV HR GENOTYPES PNL CVX NAA+PROBE: NEGATIVE
HPV16 DNA SPEC QL NAA+PROBE: NEGATIVE
HPV18 DNA SPEC QL NAA+PROBE: NEGATIVE
LAB AP HPV GENOTYPE QUESTION: YES
LAB AP HPV HR: NORMAL
LABORATORY COMMENT REPORT: NORMAL
PATH REPORT.TOTAL CANCER: NORMAL

## 2024-11-08 DIAGNOSIS — F32.0 CURRENT MILD EPISODE OF MAJOR DEPRESSIVE DISORDER WITHOUT PRIOR EPISODE (CMS-HCC): ICD-10-CM

## 2024-11-08 DIAGNOSIS — J45.20 MILD INTERMITTENT EXTRINSIC ASTHMA WITHOUT COMPLICATION (HHS-HCC): Primary | ICD-10-CM

## 2024-11-08 PROCEDURE — RXMED WILLOW AMBULATORY MEDICATION CHARGE

## 2024-11-08 RX ORDER — FLUTICASONE PROPIONATE AND SALMETEROL 250; 50 UG/1; UG/1
1 POWDER RESPIRATORY (INHALATION) 2 TIMES DAILY
Qty: 60 EACH | Refills: 10 | Status: SHIPPED | OUTPATIENT
Start: 2024-11-08

## 2024-11-08 RX ORDER — PAROXETINE HYDROCHLORIDE 40 MG/1
40 TABLET, FILM COATED ORAL DAILY
Qty: 90 TABLET | Refills: 3 | Status: SHIPPED | OUTPATIENT
Start: 2024-11-08 | End: 2025-11-07

## 2024-11-13 ENCOUNTER — PHARMACY VISIT (OUTPATIENT)
Dept: PHARMACY | Facility: CLINIC | Age: 49
End: 2024-11-13
Payer: COMMERCIAL

## 2025-01-15 ENCOUNTER — PHARMACY VISIT (OUTPATIENT)
Dept: PHARMACY | Facility: CLINIC | Age: 50
End: 2025-01-15
Payer: COMMERCIAL

## 2025-01-15 PROCEDURE — RXMED WILLOW AMBULATORY MEDICATION CHARGE

## 2025-02-04 ENCOUNTER — APPOINTMENT (OUTPATIENT)
Dept: CARDIOLOGY | Facility: CLINIC | Age: 50
End: 2025-02-04
Payer: COMMERCIAL

## 2025-02-04 VITALS
WEIGHT: 267 LBS | SYSTOLIC BLOOD PRESSURE: 134 MMHG | DIASTOLIC BLOOD PRESSURE: 72 MMHG | HEIGHT: 60 IN | BODY MASS INDEX: 52.42 KG/M2 | HEART RATE: 68 BPM

## 2025-02-04 DIAGNOSIS — Z98.890 STATUS POST CATHETER ABLATION OF ATRIAL FIBRILLATION: ICD-10-CM

## 2025-02-04 DIAGNOSIS — Z71.2 ENCOUNTER TO DISCUSS TEST RESULTS: ICD-10-CM

## 2025-02-04 DIAGNOSIS — Z78.9 NEVER SMOKED ANY SUBSTANCE: ICD-10-CM

## 2025-02-04 DIAGNOSIS — Z71.89 ENCOUNTER TO DISCUSS TREATMENT OPTIONS: ICD-10-CM

## 2025-02-04 DIAGNOSIS — I48.0 PAROXYSMAL ATRIAL FIBRILLATION WITH RVR (MULTI): ICD-10-CM

## 2025-02-04 DIAGNOSIS — I48.11 LONGSTANDING PERSISTENT ATRIAL FIBRILLATION (MULTI): Primary | ICD-10-CM

## 2025-02-04 DIAGNOSIS — Z71.89 ENCOUNTER FOR MEDICATION REVIEW AND COUNSELING: ICD-10-CM

## 2025-02-04 DIAGNOSIS — Z79.01 CURRENT USE OF LONG TERM ANTICOAGULATION: ICD-10-CM

## 2025-02-04 DIAGNOSIS — R00.2 PALPITATIONS: ICD-10-CM

## 2025-02-04 DIAGNOSIS — Z79.899 HIGH RISK MEDICATION USE: ICD-10-CM

## 2025-02-04 PROBLEM — E66.01 OBESITY, MORBID, BMI 40.0-49.9 (MULTI): Status: RESOLVED | Noted: 2024-01-09 | Resolved: 2025-02-04

## 2025-02-04 PROBLEM — E66.3 OVERWEIGHT: Status: RESOLVED | Noted: 2023-10-12 | Resolved: 2025-02-04

## 2025-02-04 PROBLEM — E66.01 MORBID OBESITY WITH BMI OF 45.0-49.9, ADULT (MULTI): Status: RESOLVED | Noted: 2023-10-20 | Resolved: 2025-02-04

## 2025-02-04 PROCEDURE — 3078F DIAST BP <80 MM HG: CPT | Performed by: INTERNAL MEDICINE

## 2025-02-04 PROCEDURE — 93000 ELECTROCARDIOGRAM COMPLETE: CPT | Performed by: INTERNAL MEDICINE

## 2025-02-04 PROCEDURE — 3008F BODY MASS INDEX DOCD: CPT | Performed by: INTERNAL MEDICINE

## 2025-02-04 PROCEDURE — 99214 OFFICE O/P EST MOD 30 MIN: CPT | Performed by: INTERNAL MEDICINE

## 2025-02-04 PROCEDURE — 3075F SYST BP GE 130 - 139MM HG: CPT | Performed by: INTERNAL MEDICINE

## 2025-02-04 PROCEDURE — 1036F TOBACCO NON-USER: CPT | Performed by: INTERNAL MEDICINE

## 2025-02-04 ASSESSMENT — ENCOUNTER SYMPTOMS
CLAUDICATION: 0
SNORING: 0
ORTHOPNEA: 0
WHEEZING: 0
SHORTNESS OF BREATH: 0
IRREGULAR HEARTBEAT: 0
PALPITATIONS: 1
NEAR-SYNCOPE: 0
PND: 0
SYNCOPE: 0
COUGH: 0

## 2025-02-04 NOTE — PROGRESS NOTES
Chief Complaint:   Follow-up (6 month)     History Of Present Illness:    Joyce Carmichael is a 49 y.o. female presenting with follow-up.   Patient denies any arrhythmia symptoms of  lightheadedness, near syncope, or syncope.  Rarely she may have a few seconds of palpitation.  Overall she is very pleased with how she feels post ablation.  Last Recorded Vitals:  Vitals:    02/04/25 0900   BP: 134/72   BP Location: Right arm   Patient Position: Sitting   Pulse: 68   Weight: 121 kg (267 lb)   Height: 1.524 m (5')       Past Medical History:  See list  Past Surgical History:  See list      Social History:  She reports that she has never smoked. She has never used smokeless tobacco. She reports that she does not drink alcohol and does not use drugs.    Family History:  Family History   Problem Relation Name Age of Onset    Other (cva) Mother      Hypertension Mother      Diabetes type II Mother      Other (Cardiac Disease) Father      Coronary artery disease Father      Hypertension Father      Obesity Father      Diabetes type II Father      No Known Problems Sister      Other (cardiomegaly) Brother      Hypothyroidism Brother          Allergies:  Iodinated contrast media and Ciprofloxacin    Outpatient Medications:  Current Outpatient Medications   Medication Instructions    albuterol 108 (90 Base) MCG/ACT inhaler 2 puffs, Every 4 hours PRN    apixaban (Eliquis) 5 mg tablet Take 1 tablet (5 mg) by mouth 2 times a day    dilTIAZem (CARDIZEM) 30 mg, 3 times daily    dofetilide (TIKOSYN) 250 mcg, oral, 2 times daily    ferrous sulfate 325 (65 Fe) MG tablet 65 mg of iron, Daily with breakfast    fexofenadine (Allegra Allergy) 180 mg tablet 1 tablet, Daily    fluticasone propion-salmeteroL (Wixela Inhub) 250-50 mcg/dose diskus inhaler 1 puff, inhalation, 2 times daily    levothyroxine (SYNTHROID, LEVOXYL) 25 mcg, oral, Daily    losartan (COZAAR) 25 mg, As needed    magnesium oxide (MAG-OX) 800 mg, oral, Daily    metoprolol  tartrate (LOPRESSOR) 50 mg, oral, 2 times daily    PARoxetine (PAXIL) 40 mg, oral, Daily   Review of Systems   Constitutional: Negative for malaise/fatigue.   Cardiovascular:  Positive for palpitations. Negative for claudication, cyanosis, irregular heartbeat, leg swelling, near-syncope, orthopnea, paroxysmal nocturnal dyspnea and syncope.   Respiratory:  Negative for cough, shortness of breath, snoring and wheezing.    All other systems reviewed and are negative.        Physical Exam:  Constitutional:       Appearance: Normal and healthy appearance. Well-developed and not in distress. Morbidly obese.   Neck:      Vascular: No JVR. JVD normal.   Pulmonary:      Effort: Pulmonary effort is normal.      Breath sounds: Normal breath sounds. No wheezing. No rhonchi. No rales.   Chest:      Chest wall: Not tender to palpatation.   Cardiovascular:      PMI at left midclavicular line. Normal rate. Regular rhythm. Normal S1. Normal S2.       Murmurs: There is no murmur.      No gallop.  No click. No rub.   Pulses:     Intact distal pulses.   Edema:     Peripheral edema absent.   Abdominal:      Tenderness: There is no abdominal tenderness.   Musculoskeletal: Normal range of motion.         General: No tenderness. Skin:     General: Skin is warm and dry.   Neurological:      General: No focal deficit present.      Mental Status: Alert and oriented to person, place and time.            Last Labs:  CBC -  Lab Results   Component Value Date    WBC 9.2 10/13/2023    HGB 7.6 (L) 10/13/2023    HCT 26.3 (L) 10/13/2023    MCV 85 10/13/2023     10/13/2023       CMP -  Lab Results   Component Value Date    CALCIUM 8.5 (L) 10/13/2023    PROT 8.2 10/11/2023    ALBUMIN 4.1 10/11/2023    AST 18 10/11/2023    ALT 15 10/11/2023    ALKPHOS 81 10/11/2023    BILITOT 0.6 10/11/2023       LIPID PANEL -   Lab Results   Component Value Date    CHOL 154 10/12/2023    TRIG 129 10/12/2023    HDL 40.4 10/12/2023    CHHDL 3.8 10/12/2023    LDLF  99 06/02/2022    VLDL 26 10/12/2023    NHDL 114 10/12/2023       RENAL FUNCTION PANEL -   Lab Results   Component Value Date    GLUCOSE 136 (H) 10/13/2023     (L) 10/13/2023    K 3.9 10/13/2023     10/13/2023    CO2 25 10/13/2023    ANIONGAP 11 10/13/2023    BUN 16 10/13/2023    CREATININE 0.65 10/13/2023    CALCIUM 8.5 (L) 10/13/2023    ALBUMIN 4.1 10/11/2023        Lab Results   Component Value Date     (H) 10/12/2022    HGBA1C 5.5 06/02/2022       Last Cardiology Tests:  ECG:  ECG 12 lead (Clinic Performed) 07/29/2024    Today.  Normal sinus rhythm.  Normal axis.  Corrected QT interval 460 ms  Echo:  Transesophageal Echo ([facility]) 10/11/2023      Lab review: I have personally reviewed the laboratory result(s) see above    Assessment/Plan   Problem List Items Addressed This Visit             ICD-10-CM    Atrial fibrillation (Multi) I48.91    Relevant Orders    ECG 12 lead (Clinic Performed)    Palpitations R00.2    Paroxysmal atrial fibrillation with RVR (Multi) I48.0    Current use of long term anticoagulation Z79.01    Never smoked any substance Z78.9    Encounter to discuss test results Z71.2    Encounter to discuss treatment options Z71.89    High risk medication use Z79.899    Encounter for medication review and counseling Z71.89    BMI 50.0-59.9, adult (Multi) Z68.43         Arabella Sanders LPN    Persistent atrial fibrillation. Doing well after cyroablation in October 2023. Reviewed ablation results again and discussed posters. Will check Holter monitor now and then in 6 months.  If Holter monitors negative then will discontinue dofetilide and Eliquis, pending clinical course and symptoms. I did review with her that if recurrent atrial arrhythmias, then would plan for hybrid approach with epicardial ablation and left atrial appendage atrial clip with Dr. Garcia.  For now she opts for continued monitoring.  Previously failed flecainide to suppress atrial fibrillation  Hgh risk med  Eliquis.  Continue.  Discussed refill.   High risk med dofetilide.  Continue.  Discussed refill.  Mild sleep apnea per patient report.  Recommend treatment for associated medical conditions that increase atrial fibrillation.  Defer to PCP.  Nonsustained wide-complex tachycardia. Likely atrial fibrillation with aberrancy. Normal ejection fraction.  No further evaluation indicated.  No history of diabetes, TIA or CVA.  PACs and intra-atrial reentry tachycardia. Stable. Reviewed meds. Continue meds. Refills discussed as above  History of appendectomy, , ovarian cystectomy and fibroadenoma.  Covid recovered.  Overweight.   AHA recommendations for exercise, diet, and behavioral modification reviewed with pt.     Counseling over 50% visit performed.  The patient and I discussed the mechanism of palpitation, PACs, atrial tachycardia, cryoablation results, serial Holter monitoring, when to discontinue medications, what are indications and if refills of medication needed, treatment options, risks, benefits, and imponderables. American Heart Association lifestyle changes and behavioral modification discussed. All questions answered in detail. Patient  very appreciative of care.

## 2025-02-04 NOTE — PATIENT INSTRUCTIONS
We will order a 24hr hour holter monitor to be done in the near future, and then another in 6 months, just before your follow up.  Follow up in 7 months(after the second monitor)  Continue same medications and treatments.   Patient educated on proper medication use.   Patient educated on risk factor modification.   Please bring any lab results from other providers / physicians to your next appointment.     Please bring all medicines, vitamins, and herbal supplements with you when you come to the office.     Prescriptions will not be filled unless you are compliant with your follow up appointments or have a follow up appointment scheduled as per instruction of your physician. Refills should be requested at the time of your visit.  SERGIO LESLIE LPN, AM SCRIBING FOR AND IN THE PRESENCE OF DR. ROMMEL WHITE MD, FACC, FACP, RS

## 2025-02-11 ENCOUNTER — APPOINTMENT (OUTPATIENT)
Dept: CARDIOLOGY | Facility: CLINIC | Age: 50
End: 2025-02-11
Payer: COMMERCIAL

## 2025-02-22 ENCOUNTER — PHARMACY VISIT (OUTPATIENT)
Dept: PHARMACY | Facility: CLINIC | Age: 50
End: 2025-02-22
Payer: COMMERCIAL

## 2025-02-22 PROCEDURE — RXMED WILLOW AMBULATORY MEDICATION CHARGE

## 2025-02-25 ENCOUNTER — APPOINTMENT (OUTPATIENT)
Dept: CARDIOLOGY | Facility: CLINIC | Age: 50
End: 2025-02-25
Payer: COMMERCIAL

## 2025-02-25 DIAGNOSIS — R00.2 PALPITATIONS: ICD-10-CM

## 2025-02-25 DIAGNOSIS — I48.0 PAROXYSMAL ATRIAL FIBRILLATION WITH RVR (MULTI): ICD-10-CM

## 2025-02-28 PROCEDURE — 93227 XTRNL ECG REC<48 HR R&I: CPT | Performed by: INTERNAL MEDICINE

## 2025-03-08 PROCEDURE — RXMED WILLOW AMBULATORY MEDICATION CHARGE

## 2025-03-10 ENCOUNTER — PHARMACY VISIT (OUTPATIENT)
Dept: PHARMACY | Facility: CLINIC | Age: 50
End: 2025-03-10
Payer: COMMERCIAL

## 2025-03-10 DIAGNOSIS — I48.11 LONGSTANDING PERSISTENT ATRIAL FIBRILLATION (MULTI): ICD-10-CM

## 2025-03-10 DIAGNOSIS — E03.9 ACQUIRED HYPOTHYROIDISM: ICD-10-CM

## 2025-03-10 PROCEDURE — RXMED WILLOW AMBULATORY MEDICATION CHARGE

## 2025-03-10 RX ORDER — LEVOTHYROXINE SODIUM 25 UG/1
25 TABLET ORAL DAILY
Qty: 90 TABLET | Refills: 3 | Status: SHIPPED | OUTPATIENT
Start: 2025-03-10 | End: 2026-03-10

## 2025-03-10 NOTE — TELEPHONE ENCOUNTER
Received request for prescription refills for patient.   Patient follows with Dr. Luis    Request is for Eliquis 5 mg  Is patient currently on medication yes    Last OV 02/04/2025  Next OV 09/09/2025    Pended for signing and sent to provider

## 2025-03-18 ENCOUNTER — APPOINTMENT (OUTPATIENT)
Dept: CARDIOLOGY | Facility: CLINIC | Age: 50
End: 2025-03-18
Payer: COMMERCIAL

## 2025-03-18 VITALS
WEIGHT: 264 LBS | HEIGHT: 64 IN | SYSTOLIC BLOOD PRESSURE: 136 MMHG | BODY MASS INDEX: 45.07 KG/M2 | HEART RATE: 68 BPM | DIASTOLIC BLOOD PRESSURE: 82 MMHG

## 2025-03-18 DIAGNOSIS — I48.0 PAROXYSMAL ATRIAL FIBRILLATION WITH RVR (MULTI): ICD-10-CM

## 2025-03-18 DIAGNOSIS — R00.2 PALPITATIONS: ICD-10-CM

## 2025-03-18 DIAGNOSIS — I10 PRIMARY HYPERTENSION: ICD-10-CM

## 2025-03-18 DIAGNOSIS — I47.19 ATRIAL TACHYCARDIA, PAROXYSMAL (CMS-HCC): ICD-10-CM

## 2025-03-18 DIAGNOSIS — Z71.2 ENCOUNTER TO DISCUSS TEST RESULTS: ICD-10-CM

## 2025-03-18 DIAGNOSIS — Z79.01 CURRENT USE OF LONG TERM ANTICOAGULATION: ICD-10-CM

## 2025-03-18 DIAGNOSIS — Z71.89 ENCOUNTER FOR MEDICATION REVIEW AND COUNSELING: ICD-10-CM

## 2025-03-18 DIAGNOSIS — Z78.9 NEVER SMOKED ANY SUBSTANCE: ICD-10-CM

## 2025-03-18 DIAGNOSIS — Z98.890 STATUS POST CATHETER ABLATION OF ATRIAL FIBRILLATION: ICD-10-CM

## 2025-03-18 PROCEDURE — 3008F BODY MASS INDEX DOCD: CPT | Performed by: INTERNAL MEDICINE

## 2025-03-18 PROCEDURE — 99215 OFFICE O/P EST HI 40 MIN: CPT | Performed by: INTERNAL MEDICINE

## 2025-03-18 PROCEDURE — 3075F SYST BP GE 130 - 139MM HG: CPT | Performed by: INTERNAL MEDICINE

## 2025-03-18 PROCEDURE — 3079F DIAST BP 80-89 MM HG: CPT | Performed by: INTERNAL MEDICINE

## 2025-03-18 PROCEDURE — RXMED WILLOW AMBULATORY MEDICATION CHARGE

## 2025-03-18 RX ORDER — DILTIAZEM HYDROCHLORIDE 30 MG/1
30 TABLET, FILM COATED ORAL 3 TIMES DAILY
Qty: 270 TABLET | Refills: 3 | Status: SHIPPED | OUTPATIENT
Start: 2025-03-18 | End: 2026-03-18

## 2025-03-18 ASSESSMENT — ENCOUNTER SYMPTOMS
IRREGULAR HEARTBEAT: 1
SNORING: 0
COUGH: 0
SHORTNESS OF BREATH: 0
SYNCOPE: 0
NEAR-SYNCOPE: 0
PALPITATIONS: 1
CLAUDICATION: 0
PND: 0
ORTHOPNEA: 0
WHEEZING: 0

## 2025-03-18 NOTE — PROGRESS NOTES
"Chief Complaint:   Follow-up (Pt c/o palps, rhythm changes. Holter on 2/25/25)     History Of Present Illness:    Joyce Carmichael is a 49 y.o. female presenting with acute follow-up for complication.  She obtained a self rhythm strip at work when she was having palpitation.  This was reviewed in detail with her.  She has episodes of paroxysmal atrial tachycardia.  I reviewed her monitor results with her.  Also reviewed the smart board for anatomy, prior cryoablation, and potential atrial tachycardia  Last Recorded Vitals:  Vitals:    03/18/25 1217   BP: 136/82   BP Location: Right arm   Patient Position: Sitting   Pulse: 68   Weight: 120 kg (264 lb)   Height: 1.626 m (5' 4\")       Past Medical History:  See list    Past Surgical History:  See list      Social History:  She reports that she has never smoked. She has never used smokeless tobacco. She reports that she does not drink alcohol and does not use drugs.    Family History:  Family History   Problem Relation Name Age of Onset    Other (cva) Mother      Hypertension Mother      Diabetes type II Mother      Other (Cardiac Disease) Father      Coronary artery disease Father      Hypertension Father      Obesity Father      Diabetes type II Father      No Known Problems Sister      Other (cardiomegaly) Brother      Hypothyroidism Brother          Allergies:  Iodinated contrast media and Ciprofloxacin    Outpatient Medications:  Current Outpatient Medications   Medication Instructions    albuterol 108 (90 Base) MCG/ACT inhaler 2 puffs, Every 4 hours PRN    apixaban (Eliquis) 5 mg tablet Take 1 tablet (5 mg) by mouth 2 times a day    dilTIAZem (CARDIZEM) 30 mg, 3 times daily    dofetilide (TIKOSYN) 250 mcg, oral, 2 times daily    ferrous sulfate 325 (65 Fe) MG tablet 65 mg of iron, Daily with breakfast    fexofenadine (Allegra Allergy) 180 mg tablet 1 tablet, Daily    fluticasone propion-salmeteroL (Wixela Inhub) 250-50 mcg/dose diskus inhaler 1 puff, inhalation, 2 " times daily    levothyroxine (SYNTHROID, LEVOXYL) 25 mcg, oral, Daily    losartan (COZAAR) 25 mg, As needed    magnesium oxide (MAG-OX) 800 mg, oral, Daily    metoprolol tartrate (LOPRESSOR) 50 mg, oral, 2 times daily    PARoxetine (PAXIL) 40 mg, oral, Daily   Review of Systems   Constitutional: Negative for malaise/fatigue.   Cardiovascular:  Positive for irregular heartbeat and palpitations. Negative for claudication, cyanosis, leg swelling, near-syncope, orthopnea, paroxysmal nocturnal dyspnea and syncope.   Respiratory:  Negative for cough, shortness of breath, snoring and wheezing.    All other systems reviewed and are negative.        Physical Exam:  Constitutional:       General: Awake.      Appearance: Normal and healthy appearance. Well-developed and not in distress. Morbidly obese.   Neck:      Vascular: No JVR. JVD normal.   Pulmonary:      Effort: Pulmonary effort is normal.      Breath sounds: Normal breath sounds. No wheezing. No rhonchi. No rales.   Chest:      Chest wall: Not tender to palpatation.   Cardiovascular:      PMI at left midclavicular line. Normal rate. Regular rhythm. Normal S1. Normal S2.       Murmurs: There is no murmur.      No gallop.  No click. No rub.   Pulses:     Intact distal pulses.   Edema:     Peripheral edema absent.   Abdominal:      Tenderness: There is no abdominal tenderness.   Musculoskeletal: Normal range of motion.         General: No tenderness. Skin:     General: Skin is warm and dry.   Neurological:      General: No focal deficit present.      Mental Status: Alert and oriented to person, place and time.            Last Labs:  CBC -  Lab Results   Component Value Date    WBC 9.2 10/13/2023    HGB 7.6 (L) 10/13/2023    HCT 26.3 (L) 10/13/2023    MCV 85 10/13/2023     10/13/2023       CMP -  Lab Results   Component Value Date    CALCIUM 8.5 (L) 10/13/2023    PROT 8.2 10/11/2023    ALBUMIN 4.1 10/11/2023    AST 18 10/11/2023    ALT 15 10/11/2023    ALKPHOS 81  10/11/2023    BILITOT 0.6 10/11/2023       LIPID PANEL -   Lab Results   Component Value Date    CHOL 154 10/12/2023    TRIG 129 10/12/2023    HDL 40.4 10/12/2023    CHHDL 3.8 10/12/2023    LDLF 99 06/02/2022    VLDL 26 10/12/2023    NHDL 114 10/12/2023       RENAL FUNCTION PANEL -   Lab Results   Component Value Date    GLUCOSE 136 (H) 10/13/2023     (L) 10/13/2023    K 3.9 10/13/2023     10/13/2023    CO2 25 10/13/2023    ANIONGAP 11 10/13/2023    BUN 16 10/13/2023    CREATININE 0.65 10/13/2023    CALCIUM 8.5 (L) 10/13/2023    ALBUMIN 4.1 10/11/2023        Lab Results   Component Value Date     (H) 10/12/2022    HGBA1C 5.5 06/02/2022       Last Cardiology Tests:  ECG:  ECG 12 lead (Clinic Performed) 02/07/2025    Rhythm strip from March 10, 2025.  3-4 beat episodes of nonsustained atrial tachycardia    Holter monitor February 2025.  No atrial fibrillation.  0.0% PVCs.  0.0% PACs.  A 3 beat episode of possible atrial tachycardia 12o bpm    Echo:  Transesophageal Echo ([facility]) 10/11/2023        Lab review: I have personally reviewed the laboratory result(s) see above    Assessment/Plan   Diagnoses and all orders for this visit:  Atrial tachycardia, paroxysmal (CMS-HCC)  Palpitations  Paroxysmal atrial fibrillation with RVR (Multi)  Primary hypertension  Status post catheter ablation of atrial fibrillation  Current use of long term anticoagulation  Encounter to discuss test results  Encounter for medication review and counseling  Never smoked any substance  Body mass index (BMI) of 45.0 to 49.9 in adult (Multi)        Arabella Sanders LPN    Persistent atrial fibrillation. Doing well after cyroablation in October 2023. Reviewed ablation results again and I reviewed smart board.  Holter monitor negative for atrial fibrillation.    Now with symptoms of paroxysmal atrial tachycardia.  Recommend evaluation for hybrid approach with epicardial ablation and left atrial appendage atrial clip with   Radha.  She agrees to consult.  Personally contacted Dr. Garcia regarding patient.    High risk med Eliquis.  Continue for now.  High risk med dofetilide.  Continue for now.  I did discuss discontinuing dofetilide as no atrial fibrillation, but the patient is hesitant discontinue antibiotic medication, given her symptomatic paroxysmal atrial tachycardia.  Mild sleep apnea per patient report.  Recommend treatment for associated medical conditions that increase atrial arrhythmias.  Lifestyle modifications discussed.  Follows with PCP  Nonsustained wide-complex tachycardia. Likely atrial tachycardia with aberrancy or atrial fibrillation with aberrancy in the past.  Normal ejection fraction.  No invasive EP evaluation evaluation indicated.  Other medical issues of hypertension, history of appendectomy, , ovarian cystectomy and fibroadenoma, COVID recovered, and overweight noted.   AHA recommendations for exercise, diet, and behavioral modification reviewed with pt.     Counseling over 50% visit performed.  The patient and I discussed the mechanism of palpitation, PACs, atrial tachycardia, cryoablation results, no recurrence of atrial fibrillation, when to discontinue medications, what are indications and if refills of medication needed, referral to Dr. Garcia for evaluation for hybrid approach, treatment options, risks, benefits, and imponderables. American Heart Association lifestyle changes and behavioral modification discussed. All questions answered in detail. Patient  very appreciative of care and time.

## 2025-03-18 NOTE — PATIENT INSTRUCTIONS
Keep your follow up as scheduled with Dr. Toby Luis will refer you to see Dr. Garcia  Compression stockings are available at [facility].  Continue same medications and treatments.   Patient educated on proper medication use.   Patient educated on risk factor modification.   Please bring any lab results from other providers / physicians to your next appointment.     Please bring all medicines, vitamins, and herbal supplements with you when you come to the office.     Prescriptions will not be filled unless you are compliant with your follow up appointments or have a follow up appointment scheduled as per instruction of your physician. Refills should be requested at the time of your visit.

## 2025-03-21 ENCOUNTER — PHARMACY VISIT (OUTPATIENT)
Dept: PHARMACY | Facility: CLINIC | Age: 50
End: 2025-03-21
Payer: COMMERCIAL

## 2025-04-02 PROCEDURE — RXMED WILLOW AMBULATORY MEDICATION CHARGE

## 2025-04-07 ENCOUNTER — PHARMACY VISIT (OUTPATIENT)
Dept: PHARMACY | Facility: CLINIC | Age: 50
End: 2025-04-07
Payer: COMMERCIAL

## 2025-04-08 PROCEDURE — RXMED WILLOW AMBULATORY MEDICATION CHARGE

## 2025-04-11 ENCOUNTER — PHARMACY VISIT (OUTPATIENT)
Dept: PHARMACY | Facility: CLINIC | Age: 50
End: 2025-04-11
Payer: COMMERCIAL

## 2025-04-22 ENCOUNTER — TELEPHONE (OUTPATIENT)
Dept: CARDIAC SURGERY | Facility: CLINIC | Age: 50
End: 2025-04-22
Payer: COMMERCIAL

## 2025-04-22 NOTE — TELEPHONE ENCOUNTER
Voicemail left for patient regarding scheduling her appointment with cardiac surgery.  Explained on message that a patient could be seen at the [place] office or [place] office.  [place] office number left on voicemail for patient to call back.

## 2025-05-02 PROCEDURE — RXMED WILLOW AMBULATORY MEDICATION CHARGE

## 2025-05-07 ENCOUNTER — PHARMACY VISIT (OUTPATIENT)
Dept: PHARMACY | Facility: CLINIC | Age: 50
End: 2025-05-07
Payer: COMMERCIAL

## 2025-05-16 DIAGNOSIS — I48.91 ATRIAL FIBRILLATION, UNSPECIFIED TYPE (MULTI): ICD-10-CM

## 2025-05-16 PROCEDURE — RXMED WILLOW AMBULATORY MEDICATION CHARGE

## 2025-05-16 RX ORDER — DOFETILIDE 0.25 MG/1
250 CAPSULE ORAL 2 TIMES DAILY
Qty: 180 CAPSULE | Refills: 3 | Status: SHIPPED | OUTPATIENT
Start: 2025-05-16 | End: 2026-05-16

## 2025-05-16 NOTE — TELEPHONE ENCOUNTER
Received request for prescription refills for patient.   Patient follows with Dr. Luis    Request is for dofetilide  Is patient currently on medication yes    Last OV 3/18/2025  Next OV 9/9/2025    Pended for signing and sent to provider

## 2025-05-21 ENCOUNTER — PHARMACY VISIT (OUTPATIENT)
Dept: PHARMACY | Facility: CLINIC | Age: 50
End: 2025-05-21
Payer: COMMERCIAL

## 2025-06-02 PROCEDURE — RXMED WILLOW AMBULATORY MEDICATION CHARGE

## 2025-06-04 DIAGNOSIS — Z12.31 ENCOUNTER FOR SCREENING MAMMOGRAM FOR MALIGNANT NEOPLASM OF BREAST: ICD-10-CM

## 2025-06-04 DIAGNOSIS — Z00.00 PERIODIC HEALTH ASSESSMENT, GENERAL SCREENING, ADULT: ICD-10-CM

## 2025-06-04 DIAGNOSIS — Z13.220 SCREENING FOR HYPERLIPIDEMIA: Primary | ICD-10-CM

## 2025-06-05 ENCOUNTER — PHARMACY VISIT (OUTPATIENT)
Dept: PHARMACY | Facility: CLINIC | Age: 50
End: 2025-06-05
Payer: COMMERCIAL

## 2025-06-10 ENCOUNTER — CLINICAL SUPPORT (OUTPATIENT)
Dept: PREADMISSION TESTING | Facility: HOSPITAL | Age: 50
End: 2025-06-10
Payer: COMMERCIAL

## 2025-06-10 VITALS — HEIGHT: 63 IN | WEIGHT: 270.06 LBS | BODY MASS INDEX: 47.85 KG/M2

## 2025-06-10 NOTE — PREPROCEDURE INSTRUCTIONS

## 2025-06-11 ENCOUNTER — TELEPHONE (OUTPATIENT)
Dept: PRIMARY CARE | Facility: CLINIC | Age: 50
End: 2025-06-11
Payer: COMMERCIAL

## 2025-06-16 ENCOUNTER — HOSPITAL ENCOUNTER (OUTPATIENT)
Dept: GASTROENTEROLOGY | Facility: HOSPITAL | Age: 50
Discharge: HOME | End: 2025-06-16
Payer: COMMERCIAL

## 2025-06-16 ENCOUNTER — ANESTHESIA EVENT (OUTPATIENT)
Dept: GASTROENTEROLOGY | Facility: HOSPITAL | Age: 50
End: 2025-06-16
Payer: COMMERCIAL

## 2025-06-16 ENCOUNTER — ANESTHESIA (OUTPATIENT)
Dept: GASTROENTEROLOGY | Facility: HOSPITAL | Age: 50
End: 2025-06-16
Payer: COMMERCIAL

## 2025-06-16 VITALS
RESPIRATION RATE: 18 BRPM | SYSTOLIC BLOOD PRESSURE: 163 MMHG | HEIGHT: 63 IN | BODY MASS INDEX: 46.91 KG/M2 | OXYGEN SATURATION: 95 % | DIASTOLIC BLOOD PRESSURE: 78 MMHG | HEART RATE: 65 BPM | TEMPERATURE: 97.9 F | WEIGHT: 264.77 LBS

## 2025-06-16 DIAGNOSIS — Z00.00 PERIODIC HEALTH ASSESSMENT, GENERAL SCREENING, ADULT: Primary | ICD-10-CM

## 2025-06-16 LAB — PREGNANCY TEST URINE, POC: NEGATIVE

## 2025-06-16 PROCEDURE — 3700000001 HC GENERAL ANESTHESIA TIME - INITIAL BASE CHARGE

## 2025-06-16 PROCEDURE — 2500000004 HC RX 250 GENERAL PHARMACY W/ HCPCS (ALT 636 FOR OP/ED): Performed by: NURSE ANESTHETIST, CERTIFIED REGISTERED

## 2025-06-16 PROCEDURE — G0121 COLON CA SCRN NOT HI RSK IND: HCPCS | Performed by: INTERNAL MEDICINE

## 2025-06-16 PROCEDURE — 7100000009 HC PHASE TWO TIME - INITIAL BASE CHARGE

## 2025-06-16 PROCEDURE — 3700000002 HC GENERAL ANESTHESIA TIME - EACH INCREMENTAL 1 MINUTE

## 2025-06-16 PROCEDURE — 7100000010 HC PHASE TWO TIME - EACH INCREMENTAL 1 MINUTE

## 2025-06-16 PROCEDURE — 2500000001 HC RX 250 WO HCPCS SELF ADMINISTERED DRUGS (ALT 637 FOR MEDICARE OP): Performed by: INTERNAL MEDICINE

## 2025-06-16 PROCEDURE — 81025 URINE PREGNANCY TEST: CPT | Performed by: INTERNAL MEDICINE

## 2025-06-16 RX ORDER — SODIUM CHLORIDE, SODIUM LACTATE, POTASSIUM CHLORIDE, CALCIUM CHLORIDE 600; 310; 30; 20 MG/100ML; MG/100ML; MG/100ML; MG/100ML
INJECTION, SOLUTION INTRAVENOUS CONTINUOUS PRN
Status: DISCONTINUED | OUTPATIENT
Start: 2025-06-16 | End: 2025-06-16

## 2025-06-16 RX ORDER — DEXTROMETHORPHAN/PSEUDOEPHED 2.5-7.5/.8
DROPS ORAL AS NEEDED
Status: COMPLETED | OUTPATIENT
Start: 2025-06-16 | End: 2025-06-16

## 2025-06-16 RX ORDER — PROPOFOL 10 MG/ML
INJECTION, EMULSION INTRAVENOUS AS NEEDED
Status: DISCONTINUED | OUTPATIENT
Start: 2025-06-16 | End: 2025-06-16

## 2025-06-16 RX ADMIN — PROPOFOL 200 MG: 10 INJECTION, EMULSION INTRAVENOUS at 13:30

## 2025-06-16 RX ADMIN — PROPOFOL 100 MG: 10 INJECTION, EMULSION INTRAVENOUS at 13:36

## 2025-06-16 RX ADMIN — SODIUM CHLORIDE, POTASSIUM CHLORIDE, SODIUM LACTATE AND CALCIUM CHLORIDE: 600; 310; 30; 20 INJECTION, SOLUTION INTRAVENOUS at 13:24

## 2025-06-16 RX ADMIN — PROPOFOL 150 MCG/KG/MIN: 10 INJECTION, EMULSION INTRAVENOUS at 13:31

## 2025-06-16 RX ADMIN — SIMETHICONE 333 MG: 20 EMULSION ORAL at 13:36

## 2025-06-16 ASSESSMENT — PAIN SCALES - GENERAL
PAINLEVEL_OUTOF10: 0 - NO PAIN
PAINLEVEL_OUTOF10: 0 - NO PAIN
PAIN_LEVEL: 0
PAINLEVEL_OUTOF10: 0 - NO PAIN

## 2025-06-16 ASSESSMENT — PAIN - FUNCTIONAL ASSESSMENT
PAIN_FUNCTIONAL_ASSESSMENT: 0-10

## 2025-06-16 ASSESSMENT — COLUMBIA-SUICIDE SEVERITY RATING SCALE - C-SSRS
6. HAVE YOU EVER DONE ANYTHING, STARTED TO DO ANYTHING, OR PREPARED TO DO ANYTHING TO END YOUR LIFE?: NO
1. IN THE PAST MONTH, HAVE YOU WISHED YOU WERE DEAD OR WISHED YOU COULD GO TO SLEEP AND NOT WAKE UP?: NO
2. HAVE YOU ACTUALLY HAD ANY THOUGHTS OF KILLING YOURSELF?: NO

## 2025-06-16 NOTE — ANESTHESIA PREPROCEDURE EVALUATION
Patient: Joyce Carmichael    Procedure Information       Date/Time: 06/16/25 1320    Scheduled providers: Mir Grey MD; Pamela Paulino MD; Ngozi Ward RN    Procedure: COLONOSCOPY    Location: [facility]            Relevant Problems   Cardiac   (+) Atrial fibrillation (Multi)   (+) Atrial tachycardia, paroxysmal   (+) Paroxysmal atrial fibrillation with RVR (Multi)   (+) Primary hypertension   (+) Unspecified atrial fibrillation (Multi)      Pulmonary   (+) Extrinsic asthma (HHS-HCC)   (+) Lung nodules   (+) Moderate persistent asthma   (+) NASREEN (obstructive sleep apnea)      Neuro   (+) Anxiety disorder   (+) Depression      GI   (+) IBS (irritable bowel syndrome)      Endocrine   (+) Acquired hypothyroidism   (+) Obesity due to excess calories      Hematology   (+) Anemia   (+) Current use of long term anticoagulation       Clinical information reviewed:   Tobacco  Allergies  Meds   Med Hx  Surg Hx  OB Status  Fam Hx  Soc   Hx        NPO Detail:  NPO/Void Status  Date of Last Liquid: 06/16/25  Time of Last Liquid: 0700  Date of Last Solid: 06/15/25  Time of Last Solid: 1100  Last Intake Type: Clear fluids  Time of Last Void: 1000         Physical Exam    Airway  Mallampati: II     Cardiovascular - normal exam   Dental - normal exam     Pulmonary - normal exam   Abdominal (+) obese  Abdomen: soft             Anesthesia Plan    History of general anesthesia?: yes  History of complications of general anesthesia?: no    ASA 3     MAC     intravenous induction   Anesthetic plan and risks discussed with patient.    Plan discussed with CRNA.

## 2025-06-16 NOTE — ANESTHESIA POSTPROCEDURE EVALUATION
Patient: Joyce Carmichael    Procedure Summary       Date: 06/16/25 Room / Location: [facility]    Anesthesia Start: 1324 Anesthesia Stop:     Procedure: COLONOSCOPY Diagnosis:       Periodic health assessment, general screening, adult      Periodic health assessment, general screening, adult    Scheduled Providers: Mir Grey MD; Pamela Paulino MD; Ngozi Ward RN Responsible Provider: Pamela Paulino MD    Anesthesia Type: MAC ASA Status: 3            Anesthesia Type: MAC    Vitals Value Taken Time   /92 06/16/25 13:51   Temp 36 06/16/25 13:51   Pulse 70 06/16/25 13:51   Resp 18 06/16/25 13:51   SpO2 100 06/16/25 13:51       Anesthesia Post Evaluation    Patient location during evaluation: bedside  Patient participation: complete - patient participated  Level of consciousness: awake and alert  Pain score: 0  Pain management: adequate  Airway patency: patent  Cardiovascular status: acceptable and stable  Respiratory status: acceptable and room air  Hydration status: acceptable  Postoperative Nausea and Vomiting: none        No notable events documented.

## 2025-06-16 NOTE — H&P
"History Of Present Illness  Joyce Carmichael is a 50 y.o. female presenting here for screening colonoscopy.      Past Medical History  Medical History[1]  Surgical History  Surgical History[2]  Social History  She reports that she has never smoked. She has never used smokeless tobacco. She reports that she does not drink alcohol and does not use drugs.    Family History  Family History[3]     Allergies  Allergies[4]  Review of Systems   Review of Systems   Constitutional: Negative.  Negative for activity change, appetite change, chills, fatigue, fever and unexpected weight change.   HENT: Negative.     Respiratory: Negative.     Cardiovascular: Negative.  Negative for chest pain and palpitations.   Gastrointestinal: Negative.  Negative for abdominal distention, abdominal pain, anal bleeding, blood in stool, constipation, diarrhea, nausea, rectal pain and vomiting.   Skin: Negative.  Negative for color change and rash.   Neurological: Negative.  Negative for dizziness, tremors, seizures, weakness and headaches.   Psychiatric/Behavioral: Negative.  Negative for confusion.     Physical Exam   General appearance: No acute distress, cooperative.  Eyes: Nonicteric, no redness or proptosis  Ears, nose, mouth, and throat: Moist mucous membranes, tongue normal  Neck: Supple, no lymphadenopathy or thyromegaly  Lungs: Clear to auscultation bilaterally  CV: Regular rate and rhythm, no murmur; no pitting edema in the lower extremities  Abd: soft, non-tender; non-distended; normal active bowel sounds; no  scars  Skin: No rashes or lesions; no liver stigmata  MSK: No deformities or joint edema/redness/tenderness  Neuro: Alert and oriented ×3, normal gait, non-focal no asterixis    Last Recorded Vitals  Blood pressure 140/79, pulse 68, temperature 36.2 °C (97.2 °F), temperature source Temporal, resp. rate 18, height 1.6 m (5' 3\"), weight 120 kg (264 lb 12.4 oz), SpO2 95%.    Assessment/Plan       Mir Grey MD       [1]   Past " Medical History:  Diagnosis Date    Anemia     Arrhythmia     Arthritis     bilateral knees    Asthma     Atrial tachycardia     Chronic urticaria     History of depression     Hypertension     Hypothyroid     Palpitations     Sleep apnea     Wears glasses    [2]   Past Surgical History:  Procedure Laterality Date    APPENDECTOMY      BREAST LUMPECTOMY Right     CARDIAC ELECTROPHYSIOLOGY PROCEDURE N/A 10/12/2023    Procedure: ABLATION A-FIB CRYO;  Surgeon: Ebony Luis MD;  Location: [facility];  Service: Electrophysiology;  Laterality: N/A;     SECTION, LOW TRANSVERSE      OVARIAN CYST SURGERY     [3]   Family History  Problem Relation Name Age of Onset    Other (cva) Mother      Hypertension Mother      Diabetes type II Mother      Other (Cardiac Disease) Father      Coronary artery disease Father      Hypertension Father      Obesity Father      Diabetes type II Father      No Known Problems Sister      Other (cardiomegaly) Brother      Hypothyroidism Brother     [4]   Allergies  Allergen Reactions    Iodinated Contrast Media Hives

## 2025-06-16 NOTE — DISCHARGE INSTRUCTIONS
Physician Phone List Provided  Patient Instructions after a Colonoscopy      The anesthetics, sedatives or narcotics which were given to you today will be acting in your body for the next 24 hours, so you might feel a little sleepy or groggy.  This feeling should slowly wear off. Carefully read and follow the instructions.     You received sedation today:  - Do not drive or operate any machinery or power tools of any kind.   - No alcoholic beverages today, not even beer or wine.  - No over the counter medications that contain alcohol or that may cause drowsiness.  - Do not make any important decisions or sign any legal documents.  - Make sure you have someone with you for first 24 hours.    While it is common to experience mild to moderate abdominal distention, gas, or belching after your procedure, if any of these symptoms occur following discharge from the GI Lab or within one week of having your procedure, call the [facility] to be advised whether a visit to your nearest Urgent Care or Emergency Department is indicated.  Take this paper with you if you go.     - If you develop an allergic reaction to the medications that were given during your procedure such as difficulty breathing, rash, hives, severe nausea, vomiting or lightheadedness.  - If you experience chest pain, shortness of breath, severe abdominal pain, fevers and chills.  -If you develop signs and symptoms of bleeding such as blood in your spit, if your stools turn black, tarry, or bloody  - If you have not urinated within 8 hours following your procedure.  - If your IV site becomes painful, red, inflamed, or looks infected.    If you received a biopsy/polypectomy/sphincterotomy the following instructions apply below:    __ Do not use Aspirin containing products, non-steroidal medications or anti-coagulants for one week following your procedure. (Examples of these types of medications are: Advil, Arthrotec, Aleve, Coumadin, Ecotrin,  Heparin, Ibuprofen, Indocin, Motrin, Naprosyn, Nuprin, Plavix, Vioxx, and Voltarin, or their generic forms.  This list is not all-inclusive.  Check with your physician or pharmacist before resuming medications.)   __ Eat a soft diet today.  Avoid foods that are poorly digested for the next 24 hours.  These foods would include: nuts, beans, lettuce, red meats, and fried foods. Start with liquids and advance your diet as tolerated, gradually work up to eating solids.   __ Do not have a Barium Study or Enema for one week.    Your physician recommends the additional following instructions:    -You have a contact number available for emergencies. The signs and symptoms of potential delayed complications were discussed with you. You may return to normal activities tomorrow.  -Resume your previous diet.  -Continue your present medications.   -We are waiting for your pathology results.  -Your physician has recommended a repeat colonoscopy (date to be determined after pending pathology results are reviewed) for surveillance based on pathology results.  -The findings and recommendations have been discussed with you.  -The findings and recommendations were discussed with your family.  - Please see Medication Reconciliation Form for new medication/medications prescribed.

## 2025-06-16 NOTE — Clinical Note
Huddle completed. Patient wristband and PAVAN information verified.  Anesthesia safety check completed. Patient was A&Ox3

## 2025-06-16 NOTE — Clinical Note
Patient tolerated procedure well. Appears comfortable with no complaints of pain. VS stable. Arousable prior to transport. Patient transported to [facility] via cart.  Report called              . Handoff completed

## 2025-06-30 PROCEDURE — RXMED WILLOW AMBULATORY MEDICATION CHARGE

## 2025-07-02 ENCOUNTER — PHARMACY VISIT (OUTPATIENT)
Dept: PHARMACY | Facility: CLINIC | Age: 50
End: 2025-07-02
Payer: COMMERCIAL

## 2025-07-02 PROCEDURE — RXMED WILLOW AMBULATORY MEDICATION CHARGE

## 2025-07-03 ENCOUNTER — PHARMACY VISIT (OUTPATIENT)
Dept: PHARMACY | Facility: CLINIC | Age: 50
End: 2025-07-03
Payer: COMMERCIAL

## 2025-07-07 DIAGNOSIS — I10 PRIMARY HYPERTENSION: ICD-10-CM

## 2025-07-07 PROCEDURE — RXMED WILLOW AMBULATORY MEDICATION CHARGE

## 2025-07-07 RX ORDER — METOPROLOL TARTRATE 50 MG/1
50 TABLET ORAL 2 TIMES DAILY
Qty: 180 TABLET | Refills: 3 | Status: SHIPPED | OUTPATIENT
Start: 2025-07-07 | End: 2026-07-07

## 2025-07-07 NOTE — TELEPHONE ENCOUNTER
Received request for prescription refills for patient.   Patient follows with Dr Luis    Request is for Metoprolol  Is patient currently on medication Y    Last OV 3/18/25  Next OV 9/9/25    Pended for signing and sent to provider

## 2025-07-08 ENCOUNTER — PHARMACY VISIT (OUTPATIENT)
Dept: PHARMACY | Facility: CLINIC | Age: 50
End: 2025-07-08
Payer: COMMERCIAL

## 2025-07-29 PROCEDURE — RXMED WILLOW AMBULATORY MEDICATION CHARGE

## 2025-08-01 ENCOUNTER — PHARMACY VISIT (OUTPATIENT)
Dept: PHARMACY | Facility: CLINIC | Age: 50
End: 2025-08-01
Payer: COMMERCIAL

## 2025-08-04 DIAGNOSIS — I48.11 LONGSTANDING PERSISTENT ATRIAL FIBRILLATION (MULTI): ICD-10-CM

## 2025-08-04 PROCEDURE — RXMED WILLOW AMBULATORY MEDICATION CHARGE

## 2025-08-04 RX ORDER — LANOLIN ALCOHOL/MO/W.PET/CERES
2 CREAM (GRAM) TOPICAL DAILY
Qty: 180 TABLET | Refills: 3 | Status: SHIPPED | OUTPATIENT
Start: 2025-08-04 | End: 2026-08-04

## 2025-08-04 NOTE — TELEPHONE ENCOUNTER
Received request for prescription refills for patient.   Patient follows with Dr. Luis    Request is for magnesium oxide  Is patient currently on medication yes    Last OV 3/18/2025  Next OV 9/9/2025    Pended for signing and sent to provider

## 2025-08-05 ENCOUNTER — APPOINTMENT (OUTPATIENT)
Dept: CARDIOLOGY | Facility: CLINIC | Age: 50
End: 2025-08-05
Payer: COMMERCIAL

## 2025-08-07 ENCOUNTER — PHARMACY VISIT (OUTPATIENT)
Dept: PHARMACY | Facility: CLINIC | Age: 50
End: 2025-08-07
Payer: COMMERCIAL

## 2025-08-14 PROCEDURE — RXMED WILLOW AMBULATORY MEDICATION CHARGE

## 2025-08-16 ENCOUNTER — PHARMACY VISIT (OUTPATIENT)
Dept: PHARMACY | Facility: CLINIC | Age: 50
End: 2025-08-16
Payer: COMMERCIAL

## 2025-08-27 PROCEDURE — RXMED WILLOW AMBULATORY MEDICATION CHARGE

## 2025-08-29 ENCOUNTER — PHARMACY VISIT (OUTPATIENT)
Dept: PHARMACY | Facility: CLINIC | Age: 50
End: 2025-08-29
Payer: COMMERCIAL

## 2025-08-30 PROCEDURE — RXMED WILLOW AMBULATORY MEDICATION CHARGE

## 2025-09-04 ENCOUNTER — PHARMACY VISIT (OUTPATIENT)
Dept: PHARMACY | Facility: CLINIC | Age: 50
End: 2025-09-04
Payer: COMMERCIAL

## 2025-09-09 ENCOUNTER — APPOINTMENT (OUTPATIENT)
Dept: CARDIOLOGY | Facility: CLINIC | Age: 50
End: 2025-09-09
Payer: COMMERCIAL

## (undated) DEVICE — BANDAGE, QUIKCLOT, INTERVENTIONAL HEMO, W/O SLIT

## (undated) DEVICE — CATHETER, ARCTIC FRONT, 28MM

## (undated) DEVICE — DILATOR, VESSEL, COONS, 12 FR X 20 CM

## (undated) DEVICE — CATHETER, ELECTROPHYSIOLOGY, SUPREME QUAD, CRD-2 5 MM, 5 FR

## (undated) DEVICE — SHIELD, PERIPHERAL, SCATPAD, 500 SERIES, 34 X 11

## (undated) DEVICE — Device

## (undated) DEVICE — CATHETER, INQUIRY, QUADRIPOLAR, 5FR X 110CM, 2-5-2MM SPACING, 1MM TIP, LG CURVE STEERABLE

## (undated) DEVICE — INTRODUCER, SHEATH, FAST-CATH, 8FR X 12CM, C-LOCK

## (undated) DEVICE — GUIDEWIRE, TORAY, .025 DIA, 230CM

## (undated) DEVICE — INTRODUCER, HEMOSTASIS, STR/J .038 IN, 10FR 12CM

## (undated) DEVICE — STEERABLE SHEATH, FLEXCATH, 12FR

## (undated) DEVICE — INTRODUCER, TRANSSEPTAL, SWARTZ, 8.5 X 67 CM, W/VALVE, 160CM SS GW 3MM J

## (undated) DEVICE — GUIDEWIRE, EMERALD, J-TIP, 0.035 X 15CM, STANDARD

## (undated) DEVICE — CABLE, COAXIAL, UMBILICAL

## (undated) DEVICE — INTRODUCER, SHEATH, FAST-CATH, 8.5FR X 12CM, C-LOCK

## (undated) DEVICE — GUIDEWIRE KIT, SAFESEPT TRANSSEPTAL, .014 X 135CM

## (undated) DEVICE — INTRODUCER, SHEATH, FAST-CATH, 6 FR X 23 CM

## (undated) DEVICE — INTRODUCER, CATHETER, HEMOSTASIS, FAST-CATH, 10 FR X 23 CM

## (undated) DEVICE — CATHETER, VIEW FLEX XTRA ICE, 9FR

## (undated) DEVICE — DILATOR, VESSEL, COONS, 14 FR X 20 CM

## (undated) DEVICE — CABLE, ACHIEVE ADVANCE, 10 PIN, 196CM, GRAY

## (undated) DEVICE — CATHETER, ACHIEVE MAPPING, 20MM

## (undated) DEVICE — INTRODUCER, HEMOSTASIS, DUO, 10FR 12CM 038GW

## (undated) DEVICE — GUIDEWIRE, ANGLE TIP,  .035 DIA, 180 CM, 3 CM TIP"

## (undated) DEVICE — CABLE, ELECTRICAL, UMBILICAL

## (undated) DEVICE — NEEDLE, SAFESEPT, BLUNT, 71CM 1 CURVE

## (undated) DEVICE — CLOSURE SYSTEM, VASCULAR, MVP 6-12FR, VENOUS

## (undated) DEVICE — ELECTRODE KIT, ENSITE X EP SYSTEM SURFACE